# Patient Record
Sex: FEMALE | Employment: OTHER | URBAN - METROPOLITAN AREA
[De-identification: names, ages, dates, MRNs, and addresses within clinical notes are randomized per-mention and may not be internally consistent; named-entity substitution may affect disease eponyms.]

---

## 2017-07-10 ENCOUNTER — HOSPITAL ENCOUNTER (OUTPATIENT)
Dept: PHYSICAL THERAPY | Age: 62
Discharge: HOME | End: 2017-07-10
Payer: COMMERCIAL

## 2017-07-10 PROCEDURE — 97161 PT EVAL LOW COMPLEX 20 MIN: CPT

## 2017-07-10 PROCEDURE — 97110 THERAPEUTIC EXERCISES: CPT

## 2017-07-10 NOTE — PROGRESS NOTES
[facility] PHYSICAL THERAPY AT [address], [address] [place] [address] Way, [address] [place] [address], [address] [place] [address] Ln - Phone: [phone]  Fax: [phone] / [facility]  Patient Name: Alvarez Zamora : 1955   Medical   Diagnosis: Low back pain [M54.5] Treatment Diagnosis: LBP   Onset Date: Aug 2016     Referral Source: DENTON Ocampo Start of Care [facility]): 7/10/2017   Prior Hospitalization: See medical history Provider #: [id number]   Prior Level of Function: Minimal to no pain with ADLs   Comorbidities: Visual impairment   Medications: Verified on Patient Summary List   The Plan of Care and following information is based on the information from the initial evaluation.   ================================================================  Assessment / key information: Alvarez Zamora is a 57 y.o.  yo female with Dx of Low back pain [M54.5]. She reports insidious onset of L sided LBP which has persisted over the last year. She feels that the pain could be related to sitting in poorly designed chairs. Pain is made worse with sitting and bending. She denies LE symptoms. On assessment, Alvarez Zamora demonstrates Lx ROM as follows: flex= WNL, ext= mod decrease, R ROT= mod decrease, L ROT= mod decrease. LE strength and ROM are WNL. SLR test is negative . Repeated movement tests are inconclusive. FOTO score= 50%.  ================================================================  Eval Complexity: History LOW Complexity : Zero comorbidities / personal factors that will impact the outcome / POC;  Examination  MEDIUM Complexity : 3 Standardized tests and measures addressing body structure, function, activity limitation and / or participation in recreation ; Presentation MEDIUM Complexity : Evolving with changing characteristics ;   Decision Making MEDIUM Complexity : FOTO score of 26-74; Overall Complexity LOW   Problem List: pain affecting function, decrease ROM, decrease strength, decrease ADL/ functional abilitiies, decrease activity tolerance and decrease flexibility/ joint mobility   Treatment Plan may include any combination of the following: Therapeutic exercise, Therapeutic activities, Neuromuscular re-education, Physical agent/modality, Manual therapy and Patient education  Patient / Family readiness to learn indicated by: asking questions, trying to perform skills and interest  Persons(s) to be included in education: patient (P)  Barriers to Learning/Limitations: None  Measures taken:    Patient Goal (s): Learn exercise to strengthen the muscles   Patient self reported health status: excellent  Rehabilitation Potential: good   Short Term Goals: To be accomplished in  2  weeks:  1 Patient will report >= 25% improvement in symptoms with ADLs. 2 Patient will be educated in extension progression principles to alleviate symptoms. 3 Patient will be educated in good posture/ body mechanics/ ergonomics for improved ADLs/work activity.  Long Term Goals: To be accomplished in  4-6  weeks:  1 Patient to report >= 70% improvement in symptoms with ADLs. 2 Patient will be independent with finalized HEP/ self maintenance. 3 ncrease FOTO score >=  64% to indicate improved function with use of LS. 4 Restore full AROM for improved ADL participation. Frequency / Duration:   Patient to be seen  2  times per week for 4-6  weeks:  Patient / Caregiver education and instruction: self care, activity modification and exercises  G-Codes (GP): na  Therapist Signature: Sabrina Starr PT Date: 5/28/8653   Certification Period:  Time: 3:50 PM   ===================================================================  I certify that the above Physical Therapy Services are being furnished while the patient is under my care. I agree with the treatment plan and certify that this therapy is necessary.     Physician Signature:        Date: Time:     Please sign and return to [facility] or you may fax the signed copy to [phone]. Thank you.

## 2017-07-10 NOTE — PROGRESS NOTES
PHYSICAL THERAPY - DAILY TREATMENT NOTE    Patient Name: Martine Ramirez        Date: 7/10/2017  : 1955    Patient  Verified: yes  Visit #:   1     Insurance: Payor: Chelsey Yi / Plan: [address] PT / Product Type: Commerical /      In time: 300 Out time: 350   Total Treatment Time: 50     Medicare Time Tracking (below)   Total Timed Codes (min):   1:1 Treatment Time:       TREATMENT AREA =  Low back pain [M54.5]    SUBJECTIVE  Pain Level (on 0 to 10 scale):  3  / 10   Medication Changes/New allergies or changes in medical history, any new surgeries or procedures?     NO    If yes, update Summary List   Subjective Functional Status/Changes:  []  No changes reported     See EVAL/ POC         OBJECTIVE  Modalities Rationale: to decrease pain, edema, neural compromise in order to perform ADLs/ rest with improved ease        min [] Estim, type/location:                                      []  att     []  unatt     []  w/US     []  w/ice    []  w/heat    min []  Mechanical Traction: type/lbs                   []  pro   []  sup   []  int   []  cont    []  before manual    []  after manual    min []  Ultrasound, settings/location:      min []  Iontophoresis w/ dexamethasone, location:                                               []  take home patch       []  in clinic    min []  Ice     []  Heat    location/position:     min []  Vasopneumatic Device, press/temp:     min []  Other:    [] Skin assessment post-treatment (if applicable):    []  intact    []  redness- no adverse reaction     []redness  adverse reaction:        10 min Therapeutic Exercise:  [x]  See flow sheet   Rationale:      increase ROM and increase strength to improve the patients ability to perform ADLs     10 min Manual Therapy: STM/ PA ofelia- grade II   Rationale:      decrease pain, increase ROM, increase tissue extensibility and decrease trigger points to improve patient's ability to perform ADLs     min Neuromuscular Re-ed:    Rationale:    improve coordination, improve balance, increase proprioception and improved posture, improve motor control to improve the patients ability to perform ADLs     min Gait Training:    Rationale:      x min Patient Education:  YES  Reviewed HEP/ sitting posture   []  Progressed/Changed HEP based on: Other Objective/Functional Measures:    See EVAL/ POC     Post Treatment Pain Level (on 0 to 10) scale:   3  / 10     ASSESSMENT      [x]  See POC     PLAN  [x]  Upgrade activities as tolerated  Continue plan of care: yes   []  Discharge due to :    []  Other:      Therapist: Dustin Pierce PT    Date: 7/10/2017 Time: 3:49 PM     No future appointments.

## 2017-07-12 ENCOUNTER — APPOINTMENT (OUTPATIENT)
Dept: PHYSICAL THERAPY | Age: 62
End: 2017-07-12

## 2017-07-24 ENCOUNTER — HOSPITAL ENCOUNTER (OUTPATIENT)
Dept: PHYSICAL THERAPY | Age: 62
Discharge: HOME | End: 2017-07-24
Payer: COMMERCIAL

## 2017-07-24 PROCEDURE — 97110 THERAPEUTIC EXERCISES: CPT

## 2017-07-24 PROCEDURE — 97140 MANUAL THERAPY 1/> REGIONS: CPT

## 2017-07-24 NOTE — PROGRESS NOTES
PHYSICAL THERAPY - DAILY TREATMENT NOTE    Patient Name: Micheline Aguilera        Date: 2017  : 1955   YES Patient  Verified  Visit #:   2     Insurance: Payor: Lamberto Dill / Plan: [address] [place] [address] Rd PT / Product Type: Commerical /      In time: 830 Out time: 905   Total Treatment Time: 35     Medicare Time Tracking (below)   Total Timed Codes (min):   1:1 Treatment Time:       TREATMENT AREA = Low back pain [M54.5]    SUBJECTIVE  Pain Level (on 0 to 10 scale):  0  / 10   Medication Changes/New allergies or changes in medical history, any new surgeries or procedures? yes    If yes, update Summary List   Subjective Functional Status/Changes:  []  No changes reported     Started taking prednisone. Really helped .   No pain now- just tightness        OBJECTIVE    15 min Therapeutic Exercise:  [x]  See flow sheet   Rationale:      increase ROM and dec neural compromise to improve the patients ability to sit or stand     10 min Manual Therapy: Technique:      [x] STM[]IASTM[x]TPR[]PROM[] Stretching  [] SOR[] man traction[]   []OP with REIL  []Jt manipulation []Gr I [] II []  III [] IV[] V[]    Treatment Area: LB    Rationale:      decrease pain, increase ROM, increase tissue extensibility and decrease trigger points to improve patient's ability to sit/ stand            Modalities Rationale:     decrease inflammation and decrease pain to improve patient's ability to sit/ stand   min [] Estim, type/location:                                      []  att     []  unatt     []  w/US     []  w/ice    []  w/heat    min []  Mechanical Traction: type/lbs                   []  pro   []  sup   []  int   []  cont    []  before manual    []  after manual    min []  Ultrasound, settings/location:      min []  Iontophoresis w/ dexamethasone, location:                                               []  take home patch       []  in clinic   10 min [x]  Ice     []  Heat    location/position:     min [] Vasopneumatic Device, press/temp:     min []  Other:    [x] Skin assessment post-treatment (if applicable):    [x]  intact    [x]  redness- no adverse reaction     []redness  adverse reaction:         min Gait Training:    Rationale:        throughout Rx min Patient Education:  YES  Reviewed HEP   []  Progressed/Changed HEP based on: Other Objective/Functional Measures:    Lx ext ROM- approx 75%    Reviewed lifting mechanics   Post Treatment Pain Level (on 0 to 10) scale:   3- sore  / 10     ASSESSMENT  Assessment/Changes in Function:     Functional improvement:  Less pain brushing teeth   Functional limitation:  \"tight\"      []  See Progress Note/Recertification   Patient will continue to benefit from skilled PT services to modify and progress therapeutic interventions, address functional mobility deficits, address ROM deficits, address strength deficits, analyze and address soft tissue restrictions, analyze and cue movement patterns, analyze and modify body mechanics/ergonomics and assess and modify postural abnormalities to attain remaining goals.    Progress toward goals / Updated goals:    No pain     PLAN  [x]  Upgrade activities as tolerated YES Continue plan of care   []  Discharge due to :    []  Other:      Therapist: May Ba PT    Date: 7/24/2017 Time: 8:29 AM     Future Appointments  Date Time Provider Noel Lozoya   7/24/2017 8:30 AM May Ba PT REHAB CENTER AT [address]   7/27/2017 8:00 AM Angely Saleem PT REHAB CENTER AT [address]   7/31/2017 9:30 AM May Ba PT REHAB CENTER AT [address]   8/3/2017 3:00 PM May Ba PT REHAB CENTER AT [address]   8/7/2017 2:00 PM May Ba PT REHAB CENTER AT [address]   8/10/2017 3:00 PM May Ba PT REHAB CENTER AT [address]   8/14/2017 2:00 PM May Ba PT REHAB CENTER AT [address]   8/17/2017 3:00 PM May Ba PT REHAB CENTER AT [address]

## 2017-07-27 ENCOUNTER — HOSPITAL ENCOUNTER (OUTPATIENT)
Dept: PHYSICAL THERAPY | Age: 62
Discharge: HOME | End: 2017-07-27
Payer: COMMERCIAL

## 2017-07-27 PROCEDURE — 97110 THERAPEUTIC EXERCISES: CPT

## 2017-07-27 PROCEDURE — 97140 MANUAL THERAPY 1/> REGIONS: CPT

## 2017-07-27 NOTE — PROGRESS NOTES
PHYSICAL THERAPY - DAILY TREATMENT NOTE    Patient Name: Guera Sauer        Date: 2017  : 1955    Patient  Verified: YES  Visit #:   3   of   8  Insurance: Payor: Malka Kent / Plan: [address] [place] [address] Rd PT / Product Type: Commerical /      In time: 8:05 Out time: 8:40   Total Treatment Time: 35     Medicare Time Tracking (below)   Total Timed Codes (min):  - 1:1 Treatment Time:  -     TREATMENT AREA/ DIAGNOSIS = Low back pain [M54.5]    SUBJECTIVE  Pain Level (on 0 to 10 scale):  0  / 10   Medication Changes/New allergies or changes in medical history, any new surgeries or procedures?     NO    If yes, update Summary List   Subjective Functional Status/Changes:  []  No changes reported     Functional improvement no LBP   Functional limitation* just stiff      OBJECTIVE  Modalities Rationale:     decrease pain and increase tissue extensibility to improve patient's ability to perform ADLs without pain   min [] Estim, type/location:                                      []  att     []  unatt     []  w/US     []  w/ice    []  w/heat    min []  Mechanical Traction: type/lbs                   []  pro   []  sup   []  int   []  cont    []  before manual    []  after manual    min []  Ultrasound, settings/location:      min []  Iontophoresis w/ dexamethasone, location:                                               []  take home patch       []  in clinic   10 min []  Ice     [x]  Heat    location/position: Prone     min []  Vasopneumatic Device, press/temp:     min []  Other:    [x] Skin assessment post-treatment (if applicable):    [x]  intact    [x]  redness- no adverse reaction     []redness  adverse reaction:        10 min Manual Therapy: DTM to L/S and T/S with focus on L QL region, OP with REIL   Rationale:      decrease pain, increase ROM and increase tissue extensibility to improve patient's ability to perform ADLs without pain    15 min Therapeutic Exercise:  [x]  See flow sheet   Rationale: increase ROM and increase strength to improve the patients ability to perform ADLs without pain          min Patient Education:  Yes    [x] Reviewed HEP   []  Progressed/Changed HEP based on: Other Objective/Functional Measures:    Pt with out pain  Reviewed proper sitting and bending mechanics  trie heat, due to no pain    Post Treatment Pain Level (on 0 to 10) scale:   0  / 10     ASSESSMENT  Assessment/Changes in Function:     Pt responding well to ext and L lat flex     []  See Progress Note/Recertification   Patient will continue to benefit from skilled PT services to modify and progress therapeutic interventions, address functional mobility deficits, address ROM deficits, address strength deficits, analyze and address soft tissue restrictions, analyze and cue movement patterns and analyze and modify body mechanics/ergonomics to attain remaining goals.    Progress toward goals / Updated goals:    Responding well to ext  Much less pain already     PLAN  [x]  Upgrade activities as tolerated YES Continue plan of care   []  Discharge due to :    []  Other:      Therapist: Ciera Connor PT    Date: 7/27/2017 Time: 8:40 AM        Future Appointments  Date Time Provider Noel Lozoya   7/31/2017 9:30 AM Tania Leggett PT REHAB CENTER AT [facility]   8/3/2017 3:00 PM Tania Leggett PT REHAB CENTER AT [facility]   8/7/2017 2:00 PM Tania Leggett PT REHAB CENTER AT [facility]   8/10/2017 3:00 PM Tania Leggett PT REHAB CENTER AT [facility]   8/14/2017 2:00 PM Tania Leggett PT REHAB CENTER AT [facility]   8/17/2017 3:00 PM Tania Leggett PT REHAB CENTER AT [facility]

## 2017-07-31 ENCOUNTER — HOSPITAL ENCOUNTER (OUTPATIENT)
Dept: PHYSICAL THERAPY | Age: 62
Discharge: HOME | End: 2017-07-31
Payer: COMMERCIAL

## 2017-07-31 PROCEDURE — 97140 MANUAL THERAPY 1/> REGIONS: CPT

## 2017-07-31 PROCEDURE — 97110 THERAPEUTIC EXERCISES: CPT

## 2017-07-31 NOTE — PROGRESS NOTES
PHYSICAL THERAPY - DAILY TREATMENT NOTE    Patient Name: Judge Hartmann        Date: 2017  : 1955   YES Patient  Verified  Visit #:   4   of   8  Insurance: Payor: Jakob Ballesteros / Plan:  [address] PT / Product Type: Commerical /      In time: 830 Out time: 910   Total Treatment Time: 40     Medicare Time Tracking (below)   Total Timed Codes (min):   1:1 Treatment Time:       TREATMENT AREA = Low back pain [M54.5]    SUBJECTIVE  Pain Level (on 0 to 10 scale):  0  / 10   Medication Changes/New allergies or changes in medical history, any new surgeries or procedures? yes    If yes, update Summary List   Subjective Functional Status/Changes:  []  No changes reported     No pain, just tight.   Only problem is sitting        OBJECTIVE    15 min Therapeutic Exercise:  [x]  See flow sheet   Rationale:      increase ROM, increase strength and dec neural compromise to improve the patients ability to sit longer     15 min Manual Therapy: Technique:      [] STM[]IASTM[]TPR[]PROM[] Stretching  [] SOR[] man traction[]   []OP with REIL  []Jt manipulation []Gr I [] II []  III [] IV[] V[]    Treatment Area:  LB   Rationale:      decrease pain, increase ROM, increase tissue extensibility, decrease trigger points and dec neural compromise to improve patient's ability to sit longer duration        Modalities Rationale:     decrease pain and increase tissue extensibility to improve patient's ability to relax   min [] Estim, type/location:                                      []  att     []  unatt     []  w/US     []  w/ice    []  w/heat    min []  Mechanical Traction: type/lbs                   []  pro   []  sup   []  int   []  cont    []  before manual    []  after manual    min []  Ultrasound, settings/location:      min []  Iontophoresis w/ dexamethasone, location:                                               []  take home patch       []  in clinic   10 min []  Ice     []  Heat    location/position:     min []  Vasopneumatic Device, press/temp:     min []  Other:    [x] Skin assessment post-treatment (if applicable):    [x]  intact    [x]  redness- no adverse reaction     []redness  adverse reaction:             throughout Rx min Patient Education:  YES  Reviewed HEP   []  Progressed/Changed HEP based on: Other Objective/Functional Measures:    Sl dec ext ROM, full rotation     Post Treatment Pain Level (on 0 to 10) scale:   0  / 10     ASSESSMENT  Assessment/Changes in Function:     Functional improvement:  Able to sit short periods   Functional limitation:  Sitting limited to 10 min before needs to shift      []  See Progress Note/Recertification   Patient will continue to benefit from skilled PT services to modify and progress therapeutic interventions, address functional mobility deficits, address ROM deficits, address strength deficits, analyze and address soft tissue restrictions, analyze and cue movement patterns and analyze and modify body mechanics/ergonomics to attain remaining goals.    Progress toward goals / Updated goals:    Min pain, inc ROM     PLAN  [x]  Upgrade activities as tolerated YES Continue plan of care   []  Discharge due to :    []  Other:      Therapist: Cassidy Aguilar PT    Date: 7/31/2017 Time: 8:31 AM     Future Appointments  Date Time Provider Noel Lozoya   8/3/2017 3:00 PM Cassidy Aguilar PT REHAB CENTER AT [facility]   8/7/2017 2:00 PM Cassidy Aguilar, PT REHAB CENTER AT [facility]   8/10/2017 3:00 PM Cassidy Aguilar, PT REHAB CENTER AT [facility]   8/14/2017 2:00 PM Cassidy Aguilar, PT REHAB CENTER AT [facility]   8/17/2017 3:00 PM Cassidy Aguilar, PT REHAB CENTER AT [facility]   0

## 2017-08-03 ENCOUNTER — HOSPITAL ENCOUNTER (OUTPATIENT)
Dept: PHYSICAL THERAPY | Age: 62
Discharge: HOME | End: 2017-08-03
Payer: COMMERCIAL

## 2017-08-03 PROCEDURE — 97110 THERAPEUTIC EXERCISES: CPT

## 2017-08-03 PROCEDURE — 97140 MANUAL THERAPY 1/> REGIONS: CPT

## 2017-08-03 NOTE — PROGRESS NOTES
PHYSICAL THERAPY - DAILY TREATMENT NOTE    Patient Name: Alvarez Zamora        Date: 8/3/2017  : 1955   YES Patient  Verified  Visit #:   5   of   8  Insurance: Payor: Abrahan Sable / Plan: [address] PT / Product Type: Commerical /      In time: 245 Out time: ***   Total Treatment Time: ***     Medicare Time Tracking (below)   Total Timed Codes (min):   1:1 Treatment Time:       TREATMENT AREA = Low back pain [M54.5]    SUBJECTIVE  Pain Level (on 0 to 10 scale):  3  / 10   Medication Changes/New allergies or changes in medical history, any new surgeries or procedures? NO    If yes, update Summary List   Subjective Functional Status/Changes:  []  No changes reported     Stopped taking Prednisone. Sitting is just the worse.   Trying to do ex 4x/day        OBJECTIVE    15 min Therapeutic Exercise:  [x]  See flow sheet   Rationale:      increase ROM, increase strength and dec neural compromise to improve the patients ability to sit longer duration     15 min Manual Therapy: Technique:      [x] STM[]IASTM[x]TPR[]PROM[] Stretching  [] SOR[] man traction[]   [x]OP with REIL  []Jt manipulation []Gr I [] II []  III [] IV[] V[]    Treatment Area:  LS- L glut   Rationale:      decrease pain, increase ROM, increase tissue extensibility and decrease trigger points to improve patient's ability to sit longer duration        Modalities Rationale:     decrease pain and increase tissue extensibility to improve patient's ability to relax muscle tone   min [] Estim, type/location:                                      []  att     []  unatt     []  w/US     []  w/ice    []  w/heat    min []  Mechanical Traction: type/lbs                   []  pro   []  sup   []  int   []  cont    []  before manual    []  after manual    min []  Ultrasound, settings/location:      min []  Iontophoresis w/ dexamethasone, location:                                               []  take home patch       []  in clinic   10 min []  Ice [x]  Heat    location/position:     min []  Vasopneumatic Device, press/temp:     min []  Other:    [x] Skin assessment post-treatment (if applicable):    [x]  intact    [x]  redness- no adverse reaction     []redness  adverse reaction:          throughout Rx min Patient Education:  YES  Reviewed HEP   []  Progressed/Changed HEP based on: Other Objective/Functional Measures:    Tender/ inc tone- L glut     Post Treatment Pain Level (on 0 to 10) scale:   ***  / 10     ASSESSMENT  Assessment/Changes in Function:     Functional improvement:  Able to lean forward to brush teeth   Functional limitation:  Limited sitting tolerance      []  See Progress Note/Recertification   Patient will continue to benefit from skilled PT services to modify and progress therapeutic interventions, address functional mobility deficits, address ROM deficits, analyze and address soft tissue restrictions, analyze and cue movement patterns and analyze and modify body mechanics/ergonomics to attain remaining goals.    Progress toward goals / Updated goals:    Slow progress     PLAN  [x]  Upgrade activities as tolerated YES Continue plan of care   []  Discharge due to :    []  Other:      Therapist: Jayna Lopez PT    Date: 8/3/2017 Time: 2:46 PM     Future Appointments  Date Time Provider Noel Lozoya   8/3/2017 3:00 PM Jayna Lopez PT REHAB CENTER AT [facility]   8/7/2017 2:00 PM Jayna Lopez PT REHAB CENTER AT [facility]   8/10/2017 3:00 PM Jayna Lopez PT REHAB CENTER AT [facility]   8/14/2017 2:00 PM Jayna Lopez PT REHAB CENTER AT [facility]   8/17/2017 3:00 PM Jayna Lopez PT REHAB CENTER AT [facility]

## 2017-08-07 ENCOUNTER — HOSPITAL ENCOUNTER (OUTPATIENT)
Dept: PHYSICAL THERAPY | Age: 62
Discharge: HOME | End: 2017-08-07
Payer: COMMERCIAL

## 2017-08-07 PROCEDURE — 97140 MANUAL THERAPY 1/> REGIONS: CPT

## 2017-08-07 PROCEDURE — 97110 THERAPEUTIC EXERCISES: CPT

## 2017-08-07 NOTE — PROGRESS NOTES
PHYSICAL THERAPY - DAILY TREATMENT NOTE    Patient Name: Sonya Gamboa        Date: 2017  : 1955   YES Patient  Verified  Visit #:   5   of   8  Insurance: Payor: Tereza June / Plan: [address] PT / Product Type: Commerical /      In time: 830 Out time: 910   Total Treatment Time: 40     Medicare Time Tracking (below)   Total Timed Codes (min):   1:1 Treatment Time:       TREATMENT AREA = Low back pain [M54.5]    SUBJECTIVE  Pain Level (on 0 to 10 scale):  0  / 10   Medication Changes/New allergies or changes in medical history, any new surgeries or procedures? NO    If yes, update Summary List   Subjective Functional Status/Changes:  []  No changes reported     Pain over weekend from traveling and sitting in low car     Overall- 50% better.    OBJECTIVE    15 min Therapeutic Exercise:  [x]  See flow sheet   Rationale:      increase ROM, increase strength and dec neural compromise to improve the patients ability to sit longer duration     15 min Manual Therapy: Technique:      [x] STM[]IASTM[x]TPR[]PROM[] Stretching  [] SOR[] man traction[]   [x]OP with REIL  []Jt manipulation []Gr I [] II []  III [] IV[] V[]    Treatment Area:  LB   Rationale:      decrease pain, increase ROM, increase tissue extensibility and decrease trigger points to improve patient's ability to sit longer duration        Modalities Rationale:     decrease pain and increase tissue extensibility to improve patient's ability to relax muscle    min [] Estim, type/location:                                      []  att     []  unatt     []  w/US     []  w/ice    []  w/heat    min []  Mechanical Traction: type/lbs                   []  pro   []  sup   []  int   []  cont    []  before manual    []  after manual    min []  Ultrasound, settings/location:      min []  Iontophoresis w/ dexamethasone, location:                                               []  take home patch       []  in clinic   10 min []  Ice     [x]  Heat location/position:     min []  Vasopneumatic Device, press/temp:     min []  Other:    [x] Skin assessment post-treatment (if applicable):    [x]  intact    [x]  redness- no adverse reaction     []redness  adverse reaction:          throughout Rx min Patient Education:  YES  Reviewed HEP   []  Progressed/Changed HEP based on: Other Objective/Functional Measures:    Tender L glut region     Post Treatment Pain Level (on 0 to 10) scale:   0  / 10     ASSESSMENT  Assessment/Changes in Function:       Functional limitation:  sitting      []  See Progress Note/Recertification   Patient will continue to benefit from skilled PT services to modify and progress therapeutic interventions, address functional mobility deficits, address ROM deficits, address strength deficits, analyze and address soft tissue restrictions, analyze and cue movement patterns and analyze and modify body mechanics/ergonomics to attain remaining goals.    Progress toward goals / Updated goals:    Able to manage pain with HEP     PLAN  [x]  Upgrade activities as tolerated YES Continue plan of care   []  Discharge due to :    []  Other:      Therapist: Lavon Sterling PT    Date: 8/7/2017 Time: 8:29 AM     Future Appointments  Date Time Provider Noel Lozoya   8/7/2017 8:30 AM Lavon Sterling PT REHAB [facility] AT [address]   8/10/2017 3:00 PM Lavon Sterling PT REHAB CENTER AT [address]   8/14/2017 2:00 PM Lavon Sterling PT REHAB CENTER AT [address]   8/17/2017 3:00 PM Puxico Hallie, PT REHAB CENTER AT [address]

## 2017-08-10 ENCOUNTER — HOSPITAL ENCOUNTER (OUTPATIENT)
Dept: PHYSICAL THERAPY | Age: 62
Discharge: HOME | End: 2017-08-10
Payer: COMMERCIAL

## 2017-08-10 PROCEDURE — 97140 MANUAL THERAPY 1/> REGIONS: CPT

## 2017-08-10 PROCEDURE — 97110 THERAPEUTIC EXERCISES: CPT

## 2017-08-10 NOTE — PROGRESS NOTES
Ilana Diadema  PHYSICAL THERAPY AT [address], [address] [place] [address], [facility], [address] [facility]  Phone: [phone]  Fax: [phone]  PROGRESS NOTE  Patient Name: Ada Singh : 1955   Treatment/Medical Diagnosis: Low back pain [M54.5]   Referral Source: DENTON Peoples     Date of Initial Visit: 7/10/17 Attended Visits: 7 Missed Visits: 0     SUMMARY OF TREATMENT  Manual therapy, including massage, OP with REIL, L piriformis release and stretching. Core stability and stretching program.  Education on posture and body mechanics as well as HEP of rep extension. CURRENT STATUS  The patient reports she is 20% improved. She has signs and symptoms consistent with an upper L L/S disc derangement. She has much improved knowledge of correct posture and body mechanics. Goal/Measure of Progress Goal Met? 1.  pt to report 70% improvement   Status at last Eval: - Current Status: 20% improved progressing   2. Independent with HEP   Status at last Eval: No HEP Current Status: Independent with HEP yes   3. Increase FOTO score to >=64, to indicate increased function   Status at last Eval: 50 Current Status: 52 progressing   4. Restore full AROM for improved ADLs    Status at last Eval: Flexion = WNLs, ext and Rot moderately decreased Current Status: Full flexion and ext and B rotation are 905 of normal progressing     New Goals to be achieved in __4__  weeks:  1. No L buttock pain for > 1 week   2. Full B pain free trunk rotation   3. Pt able to sit > = 1 hour without L buttock pain    4.  -     RECOMMENDATIONS  Continue PT 2x/week x 4-5 weeks  If you have any questions/comments please contact us directly at [phone]. Thank you for allowing us to assist in the care of your patient. Therapist Signature:  Ciera Connor PT, MDT Date: 8/10/2017     Time: 9:24 AM   NOTE TO PHYSICIAN:  PLEASE COMPLETE THE ORDERS BELOW AND FAX TO   [facility] at [address]: [phone]. If you are unable to process this request in 24 hours please contact our office: [phone].    ___ I have read the above report and request that my patient continue as recommended.   ___ I have read the above report and request that my patient continue therapy with the following changes/special instructions:_________________________________________________________   ___ I have read the above report and request that my patient be discharged from therapy.      Physician Signature:        Date:       Time:

## 2017-08-14 ENCOUNTER — APPOINTMENT (OUTPATIENT)
Dept: PHYSICAL THERAPY | Age: 62
End: 2017-08-14
Payer: COMMERCIAL

## 2017-08-17 ENCOUNTER — HOSPITAL ENCOUNTER (OUTPATIENT)
Dept: PHYSICAL THERAPY | Age: 62
End: 2017-08-17
Payer: COMMERCIAL

## 2017-08-17 ENCOUNTER — HOSPITAL ENCOUNTER (OUTPATIENT)
Dept: PHYSICAL THERAPY | Age: 62
Discharge: HOME | End: 2017-08-17
Payer: COMMERCIAL

## 2017-08-17 PROCEDURE — 97110 THERAPEUTIC EXERCISES: CPT

## 2017-08-17 PROCEDURE — 97140 MANUAL THERAPY 1/> REGIONS: CPT

## 2017-08-17 NOTE — PROGRESS NOTES
PHYSICAL THERAPY - DAILY TREATMENT NOTE    Patient Name: Elidia Seymour        Date: 2017  : 1955    Patient  Verified: YES  Visit #:     Insurance: Payor: Anthony Backer / Plan: [address] PT / Product Type: Commerical /      In time: 3:10 Out time: 3:50   Total Treatment Time: 40     Medicare Time Tracking (below)   Total Timed Codes (min):  - 1:1 Treatment Time:  -     TREATMENT AREA/ DIAGNOSIS = Low back pain [M54.5]    SUBJECTIVE  Pain Level (on 0 to 10 scale):  4  / 10   Medication Changes/New allergies or changes in medical history, any new surgeries or procedures? NO    If yes, update Summary List   Subjective Functional Status/Changes:  [x]  No changes reported     Functional improvement    Functional limitation  A bad day today     OBJECTIVE  Modalities Rationale:     decrease edema, decrease inflammation and decrease pain to improve patient's ability to perform ADLs without pain   min [] Estim, type/location:                                      []  att     []  unatt     []  w/US     []  w/ice    []  w/heat    min []  Mechanical Traction: type/lbs                   []  pro   []  sup   []  int   []  cont    []  before manual    []  after manual    min []  Ultrasound, settings/location:      min []  Iontophoresis w/ dexamethasone, location:                                               []  take home patch       []  in clinic   10 min [x]  Ice     []  Heat    location/position:  In prone extension, using the table to put her into passive extension    min []  Vasopneumatic Device, press/temp:     min []  Other:    [x] Skin assessment post-treatment (if applicable):    [x]  intact    [x]  redness- no adverse reaction     []redness  adverse reaction:        10 min Manual Therapy: DTM to L/S and T/S, OP with REIL, L piriformis release, GD IV PA mobs to L/S   Rationale:      decrease pain, increase ROM and increase tissue extensibility to improve patient's ability to perform ADLs without pain    20 min Therapeutic Exercise:  [x]  See flow sheet   Rationale:      increase ROM and increase strength to improve the patients ability to perform ADLs without pain          min Patient Education:  Yes    [x] Reviewed HEP   []  Progressed/Changed HEP based on: Other Objective/Functional Measures:    Pt with L buttock pain, mad worse with sitting  No change in pain with L SGIS or REIL, REIL with OP  Tender L piriformis  Added static passive ext for 10 minutes with ice   Post Treatment Pain Level (on 0 to 10) scale:   2  / 10     ASSESSMENT  Assessment/Changes in Function:     Signs and symptoms consistent with disc derangement     []  See Progress Note/Recertification   Patient will continue to benefit from skilled PT services to modify and progress therapeutic interventions, address functional mobility deficits, address ROM deficits, address strength deficits, analyze and address soft tissue restrictions, analyze and cue movement patterns, analyze and modify body mechanics/ergonomics and assess and modify postural abnormalities to attain remaining goals. Progress toward goals / Updated goals:    Good hip hinge and body mechanics     PLAN  [x]  Upgrade activities as tolerated YES Continue plan of care   []  Discharge due to :    []  Other:      Therapist: Alin Huang PT    Date: 8/17/2017 Time: 3:46 PM        No future appointments.

## 2017-10-10 ENCOUNTER — HOSPITAL ENCOUNTER (OUTPATIENT)
Dept: PHYSICAL THERAPY | Age: 62
Discharge: HOME | End: 2017-10-10
Payer: COMMERCIAL

## 2017-10-10 PROCEDURE — 97110 THERAPEUTIC EXERCISES: CPT

## 2017-10-10 PROCEDURE — 97161 PT EVAL LOW COMPLEX 20 MIN: CPT

## 2017-10-10 NOTE — PROGRESS NOTES
PHYSICAL THERAPY - DAILY TREATMENT NOTE    Patient Name: Pily Merchant        Date: 10/10/2017  : 1955    Patient  Verified: yes  Visit #:   1     Insurance: Payor: Ladan Telles / Plan: [address] PT / Product Type: Commerical /      In time: 1210 Out time: 115   Total Treatment Time: 65   TREATMENT AREA =  Low back pain [M54.5]  Left hip pain [M25.552]    SUBJECTIVE  Pain Level (on 0 to 10 scale):  3  / 10   Medication Changes/New allergies or changes in medical history, any new surgeries or procedures? NO    If yes, update Summary List   Subjective Functional Status/Changes:  []  No changes reported     See IE.     OBJECTIVE    10 min Manual Therapy: B prone hip ER, IR sidelying hip extension, LAD, SFMA assessment to determine joint restrictions   Rationale:      decrease pain, increase ROM, increase tissue extensibility and decrease trigger points to improve patient's ability to perform ADLs    15 min Patient Education:  YES  Reviewed HEP/ sitting posture   []  Progressed/Changed HEP based on: Other Objective/Functional Measures:  See IEval   Post Treatment Pain Level (on 0 to 10) scale:   3   10     ASSESSMENT      [x]  See POC     PLAN  [x]  Upgrade activities as tolerated  Continue plan of care: yes   []  Discharge due to :    []  Other:      Therapist: Bruno Brand PT    Date: 10/10/2017 Time: 12:49 PM     No future appointments.

## 2017-10-10 NOTE — PROGRESS NOTES
[facility] PHYSICAL THERAPY AT [address], [address] [place] [address], [address], [address] [place] [address]  Phone: [phone]  Fax: [phone]  DISCHARGE NOTE  Patient Name: Miah Woodson : 1955   Treatment/Medical Diagnosis: Low back pain [M54.5]   Referral Source: DENTON Villalpando     Date of Initial Visit: 7/10/17 Attended Visits: 8 Missed Visits: 0     SUMMARY OF TREATMENT  Manual therapy, including massage, OP with REIL, L piriformis release and stretching. Core stability and stretching program.  Education on posture and body mechanics as well as HEP of rep extension. CURRENT STATUS  The patient only attended one more visit after her 8/10/17 reassessment and has been discharged. Goal/Measure of Progress Goal Met? 1.  pt to report 70% improvement   Status at last Eval: - Current Status: 20% improved progressing   2. Independent with HEP   Status at last Eval: No HEP Current Status: Independent with HEP yes   3. Increase FOTO score to >=64, to indicate increased function   Status at last Eval: 50 Current Status: 52 progressing   4. Restore full AROM for improved ADLs    Status at last Eval: Flexion = WNLs, ext and Rot moderately decreased Current Status: Full flexion and ext and B rotation are 905 of normal progressing   RECOMMENDATIONS  DC patient from PT, secondary to  Non attendance. If you have any questions/comments please contact us directly at [phone]. Thank you for allowing us to assist in the care of your patient. Therapist Signature:  Matt Christopher PT, MDT Date: 10/10/2017     Time: 9:24 AM

## 2017-10-12 ENCOUNTER — HOSPITAL ENCOUNTER (OUTPATIENT)
Dept: PHYSICAL THERAPY | Age: 62
Discharge: HOME | End: 2017-10-12
Payer: COMMERCIAL

## 2017-10-12 PROCEDURE — 97110 THERAPEUTIC EXERCISES: CPT

## 2017-10-12 PROCEDURE — 97140 MANUAL THERAPY 1/> REGIONS: CPT

## 2017-10-12 NOTE — PROGRESS NOTES
PHYSICAL THERAPY - DAILY TREATMENT NOTE    Patient Name: Sharmila Pineda        Date: 10/12/2017  : 1955   YES Patient  Verified  Visit #:   2   of   8  Insurance: Payor: Timur Smith / Plan: [address] / Product Type: Commerical /      In time: 1110 Out time: 6698   Total Treatment Time: 60     TREATMENT AREA =  Low back pain [M54.5]  Left hip pain [M25.552]    SUBJECTIVE  Pain Level (on 0 to 10 scale):  2  / 10   Medication Changes/New allergies or changes in medical history, any new surgeries or procedures? NO    If yes, update Summary List   Subjective Functional Status/Changes:  []  No changes reported     Functional improvements: doing hot yoga noticed some restrictions and pain in R hip anterior but modified  Functional impairments: pain in B hip with walking, standing.        OBJECTIVE  Modalities Rationale:     decrease pain to improve patient's ability to perform ADLs   min [] Estim, type/location:                                      []  att     []  unatt     []  w/US     []  w/ice    []  w/heat    min []  Mechanical Traction: type/lbs                   []  pro   []  sup   []  int   []  cont    []  before manual    []  after manual    min []  Ultrasound, settings/location:      min []  Iontophoresis w/ dexamethasone, location:                                               []  take home patch       []  in clinic   10 min [x]  Ice     []  Heat    location/position: Supine L L/S and R hip    min []  Vasopneumatic Device, press/temp:     min []  Other:    [x] Skin assessment post-treatment (if applicable):    [x]  intact    []  redness- no adverse reaction     []redness  adverse reaction:        20 min Manual Therapy: Technique:      [x] S/DTM []IASTM []PROM [] Passive Stretching   [x]manual TPR    [x]Jt manipulation:Gr I [] II []  III [] IV[] V[]  Treatment Area:  STM to B gluteals, R hip flexor and prone hip ER, IR stretching   Rationale:      decrease pain and increase ROM to improve patient's ability to perform ADLs    30 min Therapeutic Exercise:  [x]  See flow sheet   Rationale:      increase ROM and increase strength to improve the patients ability to perform ADLs. 10, during CP min Patient Education:  YES  Reviewed HEP   []  Progressed/Changed HEP based on: Other Objective/Functional Measures: Added bridges, clams, SKTC/modified valarie stretch on R. Pt with pain during [address] R. Pain with full hip extension. Post Treatment Pain Level (on 0 to 10) scale:   2  / 10     ASSESSMENT  Assessment/Changes in Function:     Pt with good response to updated HEP. Ed on performing hip flexor stretching for HEP. CP for home use ed.     []  See Progress Note/Recertification   Patient will continue to benefit from skilled PT services to modify and progress therapeutic interventions, address functional mobility deficits, address ROM deficits, address strength deficits, analyze and address soft tissue restrictions, analyze and cue movement patterns, analyze and modify body mechanics/ergonomics and assess and modify postural abnormalities to attain remaining goals. Progress toward goals / Updated goals:    Pt with good progress to I with HEP, STG 1.      PLAN  [x]  Upgrade activities as tolerated YES Continue plan of care   []  Discharge due to :    []  Other:      Therapist: Oli Golden PT    Date: 10/12/2017 Time: 11:15 AM     Future Appointments  Date Time Provider Noel Lozoya   10/17/2017 2:30 PM Oli Golden PT REHAB CENTER AT [facility]   10/25/2017 12:00 PM Oli Golden PT REHAB CENTER AT [facility]   11/1/2017 9:00 AM Nadia Aquino PT REHAB CENTER AT [facility]   11/10/2017 10:30 AM Nadia Aquino PT REHAB CENTER AT [facility]   11/15/2017 11:30 AM Nadia Aquino PT REHAB CENTER AT [facility]   11/17/2017 10:30 AM Nadia Aquino PT REHAB CENTER AT [facility]   11/20/2017 10:30 AM Nadia Aquino PT REH[facility] AT [facility]   11/22/2017 10:30 AM Nadia Aquino, PT REHAB CENTER AT [facility]   11/27/2017 10:30 AM Nadia Aquino, PT REHAB CENTER AT [facility]   11/29/2017 10:30 AM Nadia Aquino, PT REHAB CENTER AT [facility]

## 2017-10-17 ENCOUNTER — HOSPITAL ENCOUNTER (OUTPATIENT)
Dept: PHYSICAL THERAPY | Age: 62
End: 2017-10-17
Payer: COMMERCIAL

## 2017-10-25 ENCOUNTER — HOSPITAL ENCOUNTER (OUTPATIENT)
Dept: PHYSICAL THERAPY | Age: 62
Discharge: HOME | End: 2017-10-25
Payer: COMMERCIAL

## 2017-10-25 PROCEDURE — 97110 THERAPEUTIC EXERCISES: CPT

## 2017-10-25 PROCEDURE — 97140 MANUAL THERAPY 1/> REGIONS: CPT

## 2017-10-25 NOTE — PROGRESS NOTES
PHYSICAL THERAPY - DAILY TREATMENT NOTE    Patient Name: Silvia Tony        Date: 10/25/2017  : 1955   YES Patient  Verified  Visit #:   3 of   8  Insurance: Payor: Tilman Bad / Plan:  [address] PT / Product Type: Commerical /      In time: 12 Out time: 1    Total Treatment Time: 60     TREATMENT AREA =  Low back pain [M54.5]  Left hip pain [M25.552]    SUBJECTIVE  Pain Level (on 0 to 10 scale):  2  / 10   Medication Changes/New allergies or changes in medical history, any new surgeries or procedures?    no    If yes, update Summary List   Subjective Functional Status/Changes:  []  No changes reported     Functional improvements: 25-50% better, noting more awareness with lifting leg, able to sit a little longer. Able to go back to light yoga. Steroidal injection to bursa L, lidocaine to piriformis. Functional impairments: pain in B hip with walking, standing.        OBJECTIVE  Modalities Rationale:     decrease pain to improve patient's ability to perform ADLs   min [] Estim, type/location:                                      []  att     []  unatt     []  w/US     []  w/ice    []  w/heat    min []  Mechanical Traction: type/lbs                   []  pro   []  sup   []  int   []  cont    []  before manual    []  after manual    min []  Ultrasound, settings/location:      min []  Iontophoresis w/ dexamethasone, location:                                               []  take home patch       []  in clinic   10 min [x]  Ice     []  Heat    location/position: Supine L L/S and R hip    min []  Vasopneumatic Device, press/temp:     min []  Other:    [x] Skin assessment post-treatment (if applicable):    [x]  intact    [x]  redness- no adverse reaction     []redness  adverse reaction:        20 min Manual Therapy: Technique:      [x] S/DTM []IASTM []PROM [] Passive Stretching   [x]manual TPR    [x]Jt manipulation:Gr I [] II []  III [] IV[] V[]  Treatment Area:  STM to B gluteals, R hip flexor and prone hip ER, IR stretching   Rationale:      decrease pain and increase ROM to improve patient's ability to perform ADLs, IADLs    30 min Therapeutic Exercise:  [x]  See flow sheet   Rationale:      increase ROM and increase strength to improve the patients ability to perform ADLs, IADLs     T/o tx. min Patient Education:  YES  Reviewed HEP   []  Progressed/Changed HEP based on: Other Objective/Functional Measures:    Hip flexion with pain. Modified with hip strap and stretch off side of table. Advanced clams to RTB B.  SL hip bridge with pain. Post Treatment Pain Level (on 0 to 10) scale:   2  / 10     ASSESSMENT  Assessment/Changes in Function:   Modified hip flexion with stretching. Advanced to ball bridge. []  See Progress Note/Recertification   Patient will continue to benefit from skilled PT services to modify and progress therapeutic interventions, address functional mobility deficits, address ROM deficits, address strength deficits, analyze and address soft tissue restrictions, analyze and cue movement patterns, analyze and modify body mechanics/ergonomics and assess and modify postural abnormalities to attain remaining goals. Progress toward goals / Updated goals:    Pt with good progress to I with HEP, STG 1.      PLAN  [x]  Upgrade activities as tolerated YES Continue plan of care   []  Discharge due to :    []  Other:      Therapist: Debora Rae PT    Date: 10/25/2017 Time: 12:13pm     Future Appointments  Date Time Provider Noel Lozoya   11/1/2017 9:00 AM Debora Rae PT REHAB [facility]ENTER AT [facility]   11/10/2017 10:30 AM Debora Rae PT RE[facility] AT [facility]   11/15/2017 11:30 AM Nadia Whittaker PT REHAB [facility]ENTER AT [facility]   11/17/2017 10:30 AM Nadia Whittaker PT RE[facility] AT [facility]   11/20/2017 10:30 AM Nadia Whittaker PT RE[facility] AT [facility]   11/22/2017 10:30 AM Nadia Whittaker PT RE[facility] AT [facility]   11/27/2017 10:30 AM Nadia Whittaker PT RE[facility] AT [facility]   11/29/2017 10:30 AM Debora Kyra, PT REHAB CENTER AT [facility]

## 2017-10-31 ENCOUNTER — HOSPITAL ENCOUNTER (OUTPATIENT)
Dept: PHYSICAL THERAPY | Age: 62
Discharge: HOME | End: 2017-10-31
Payer: COMMERCIAL

## 2017-10-31 PROCEDURE — 97140 MANUAL THERAPY 1/> REGIONS: CPT

## 2017-10-31 PROCEDURE — 97110 THERAPEUTIC EXERCISES: CPT

## 2017-11-01 ENCOUNTER — APPOINTMENT (OUTPATIENT)
Dept: PHYSICAL THERAPY | Age: 62
End: 2017-11-01
Payer: COMMERCIAL

## 2017-11-10 ENCOUNTER — APPOINTMENT (OUTPATIENT)
Dept: PHYSICAL THERAPY | Age: 62
End: 2017-11-10
Payer: COMMERCIAL

## 2017-11-13 ENCOUNTER — HOSPITAL ENCOUNTER (OUTPATIENT)
Dept: PHYSICAL THERAPY | Age: 62
Discharge: HOME | End: 2017-11-13
Payer: COMMERCIAL

## 2017-11-13 PROCEDURE — 97110 THERAPEUTIC EXERCISES: CPT

## 2017-11-13 PROCEDURE — 97140 MANUAL THERAPY 1/> REGIONS: CPT

## 2017-11-13 NOTE — PROGRESS NOTES
PHYSICAL THERAPY - DAILY TREATMENT NOTE    Patient Name: Karen Eugene        Date: 2017  : 1955   YES Patient  Verified  Visit #:   5 of   8  Insurance: Payor: Luis Orosco / Plan:  [facility] [address] [facility] / Product Type: Commerical /      In time: 930 Out time: 6882   Total Treatment Time: 70     TREATMENT AREA =  Low back pain [M54.5]  Left hip pain [M25.552]    SUBJECTIVE  Pain Level (on 0 to 10 scale):  2  / 10   Medication Changes/New allergies or changes in medical history, any new surgeries or procedures?    no    If yes, update Summary List   Subjective Functional Status/Changes:  []  No changes reported   Still hard to sit but was able to sit for 30 minutes. Unable to sit on sofa, thinks she will need to get new furniture. Was able to walk 4 miles, average is 2. Has injection tomorrow. Functional impairments:seated activity > 30 minutes. Can't flex hip> 90 degrees. OBJECTIVE  Modalities Rationale:     decrease pain to improve patient's ability to perform ADLs   min [] Estim, type/location:                                      []  att     []  unatt     []  w/US     []  w/ice    []  w/heat    min []  Mechanical Traction: type/lbs                   []  pro   []  sup   []  int   []  cont    []  before manual    []  after manual    min []  Ultrasound, settings/location:      min []  Iontophoresis w/ dexamethasone, location:                                               []  take home patch       []  in clinic   10 min [x]  Ice     []  Heat    location/position: Supine L L/S and R hip    min []  Vasopneumatic Device, press/temp:     min []  Other:    [x] Skin assessment post-treatment (if applicable):    [x]  intact    [x]  redness- no adverse reaction     []redness  adverse reaction:        30 min Manual Therapy: Technique:      [x] S/DTM []IASTM []PROM [] Passive Stretching   [x]manual TPR    [x]Jt manipulation:Gr I [] II []  III [] IV[] V[]  Treatment Area:   Hip PROM after STM to R psoas, hip flexor, L piriformis, ITB L f/b prone ER, IR in prone. Rationale:      decrease pain and increase ROM to improve patient's ability to perform ADLs, IADLs    30 min Therapeutic Exercise:  [x]  See flow sheet   Rationale:      increase ROM and increase strength to improve the patients ability to perform ADLs, IADLs     T/o tx. min Patient Education:  YES  Reviewed HEP   []  Progressed/Changed HEP based on: Other Objective/Functional Measures:  Limited hip flexion ROM with SKTC, PROM > AAROM. Pt with ed on yoga modifications. Hip flexion, ER PROM in supine, prone. Added clams 1-4 without TB   Post Treatment Pain Level (on 0 to 10) scale:   2  / 10     ASSESSMENT  Assessment/Changes in Function:   Added clams 1-4 with minimal pain noted. Pt ed and verb understanding of modifications for yoga postures to reduce hip and LBP. Held supine hip flexor stretch sec to LBP modified to sidelying with PPT. []  See Progress Note/Recertification   Patient will continue to benefit from skilled PT services to modify and progress therapeutic interventions, address functional mobility deficits, address ROM deficits, address strength deficits, analyze and address soft tissue restrictions, analyze and cue movement patterns, analyze and modify body mechanics/ergonomics, assess and modify postural abnormalities and instruct in home and community integration to attain remaining goals. Progress toward goals / Updated goals:  Patient will be educated in stretching, strengthening HEP for B hip mm to improve ADLs. -Pt goal met            PLAN  [x]  Upgrade activities as tolerated YES Continue plan of care   []  Discharge due to :    []  Other:      Therapist: Clarence Telles PT    Date: 11/13/2017 Time: 10:26 am     Future Appointments  Date Time Provider Noel Lozoya   11/15/2017 11:30 AM Nadia Keller PT [facility]   11/17/2017 10:30 AM Clarence Telles PT [facility] AT [facility]   11/20/2017 10:30 AM Nadia Keller, PT REHAB [facility]ENTER AT [facility] 11/22/2017 10:30  [address], PT REHAB CENTER AT [facility]   11/27/2017 10:30  [address], PT REHAB [facility]ENTER AT [facility]   11/29/2017 10:30  [address], PT REHAB [facility]ENTER AT [facility]

## 2017-11-15 ENCOUNTER — APPOINTMENT (OUTPATIENT)
Dept: PHYSICAL THERAPY | Age: 62
End: 2017-11-15
Payer: COMMERCIAL

## 2017-11-16 NOTE — PROGRESS NOTES
PHYSICAL THERAPY - DAILY TREATMENT NOTE    Patient Name: Nelida Kiran        Date: 10/31/2017  : 1955   YES Patient  Verified  Visit #:   4 of   8  Insurance: Payor: Andreina Aggarwal / Plan: [address] [place] [address] Rd PT / Product Type: Commerical /      In time: 6 Out time: 1130   Total Treatment Time: 60     TREATMENT AREA =  Low back pain [M54.5]  Left hip pain [M25.552]    SUBJECTIVE  Pain Level (on 0 to 10 scale):  2  / 10   Medication Changes/New allergies or changes in medical history, any new surgeries or procedures?    no    If yes, update Summary List   Subjective Functional Status/Changes:  []  No changes reported   Bought a \"huge\" ice pack. Functional improvements:  Pt with increased seated tolerance, just came from getting injections this am.  Has been doing her exercises, back to yoga modified. Functional impairments:seated activity > 30 minutes. OBJECTIVE  Modalities Rationale:     decrease pain to improve patient's ability to perform ADLs   min [] Estim, type/location:                                      []  att     []  unatt     []  w/US     []  w/ice    []  w/heat    min []  Mechanical Traction: type/lbs                   []  pro   []  sup   []  int   []  cont    []  before manual    []  after manual    min []  Ultrasound, settings/location:      min []  Iontophoresis w/ dexamethasone, location:                                               []  take home patch       []  in clinic   10 min [x]  Ice     []  Heat    location/position: Supine L L/S and R hip    min []  Vasopneumatic Device, press/temp:     min []  Other:    [x] Skin assessment post-treatment (if applicable):    [x]  intact    [x]  redness- no adverse reaction     []redness  adverse reaction:        20 min Manual Therapy: Technique:      [x] S/DTM []IASTM []PROM [] Passive Stretching   [x]manual TPR    [x]Jt manipulation:Gr I [] II []  III [] IV[] V[]  Treatment Area:   Hip stretching with strap with mobility with movement,  STM to R psoas, hip flexor, L piriformis, ITB L. Rationale:      decrease pain and increase ROM to improve patient's ability to perform ADLs, IADLs    30 min Therapeutic Exercise:  [x]  See flow sheet   Rationale:      increase ROM and increase strength to improve the patients ability to perform ADLs, IADLs     T/o tx. min Patient Education:  YES  Reviewed HEP   []  Progressed/Changed HEP based on: Other Objective/Functional Measures:  Limited hip flexion ROM with SKTC and ER with figure 4. Pt at 110 hip flexion R, ER to 30 L. Modified bridges to increase level of difficulty. Post Treatment Pain Level (on 0 to 10) scale:   2  / 10     ASSESSMENT  Assessment/Changes in Function:   Added bridges with heel raise and toe raise, hip adduction. Post tx to 130 R hip flexion, ER to 40 degrees post manual activity. []  See Progress Note/Recertification   Patient will continue to benefit from skilled PT services to modify and progress therapeutic interventions, address functional mobility deficits, address ROM deficits, address strength deficits, analyze and address soft tissue restrictions, analyze and cue movement patterns, analyze and modify body mechanics/ergonomics and assess and modify postural abnormalities to attain remaining goals. Progress toward goals / Updated goals:  Patient will report >= 25% improvement in standing and sitting for 15 minutes for ADLs--Pt goal met, 30 minutes currently.     Pt with excellent progress in ROM, pain goal.     PLAN  [x]  Upgrade activities as tolerated YES Continue plan of care   []  Discharge due to :    []  Other:      Therapist: Sridevi Nicholson PT    Date: 10/31/2017 Time: 11:23 am     Future Appointments  Date Time Provider Noel Lozoya   10/31/2017 10:30 AM Sridevi Nicholson PT [facility] AT [facility]   11/13/2017 9:30 AM Nadia Terrell, PT [facility] AT [facility]   11/15/2017 11:30 AM Nadia Terrell PT [facility]   11/17/2017 10:30 AM Sridevi Nicholson, PT [facility] AT [facility]   11/20/2017 10:30 AM Sridevi Nicholson PT REHAB [facility]ENTER AT [facility]   11/22/2017 10:30  [address], PT REHAB CENTER AT [facility]   11/27/2017 10:30  [address], PT REHAB [facility]ENTER AT [facility]   11/29/2017 10:30 AM Nadia Aragon, PT REHAB CENTER AT [facility] no

## 2017-11-17 ENCOUNTER — HOSPITAL ENCOUNTER (OUTPATIENT)
Dept: PHYSICAL THERAPY | Age: 62
Discharge: HOME | End: 2017-11-17
Payer: COMMERCIAL

## 2017-11-17 PROCEDURE — 97110 THERAPEUTIC EXERCISES: CPT

## 2017-11-17 PROCEDURE — 97140 MANUAL THERAPY 1/> REGIONS: CPT

## 2017-11-17 NOTE — PROGRESS NOTES
PHYSICAL THERAPY - DAILY TREATMENT NOTE    Patient Name: Pily Merchant        Date: 2017  : 1955   YES Patient  Verified  Visit #:   Insurance: Payor: Ladan Telles / [address] / Product Type: Commerical /      In time: 1081 Out time: 1130   Total Treatment Time: 60     TREATMENT AREA =  Low back pain [M54.5]  Left hip pain [M25.552]    SUBJECTIVE  Pain Level (on 0 to 10 scale):  2  / 10   Medication Changes/New allergies or changes in medical history, any new surgeries or procedures?    no    If yes, update Summary List   Subjective Functional Status/Changes:  []  No changes reported   Feeling some progress with her activity in water, standing, walking. Functional impairments:seated activity > 30 minutes. Can't flex hip> 95 degrees. OBJECTIVE  Modalities Rationale:     decrease pain to improve patient's ability to perform ADLs   min [] Estim, type/location:                                      []  att     []  unatt     []  w/US     []  w/ice    []  w/heat    min []  Mechanical Traction: type/lbs                   []  pro   []  sup   []  int   []  cont    []  before manual    []  after manual    min []  Ultrasound, settings/location:      min []  Iontophoresis w/ dexamethasone, location:                                               []  take home patch       []  in clinic   10 min [x]  Ice     []  Heat    location/position: Supine L L/S and R hip    min []  Vasopneumatic Device, press/temp:     min []  Other:    [x] Skin assessment post-treatment (if applicable):    [x]  intact    [x]  redness- no adverse reaction     []redness  adverse reaction:        15 min Manual Therapy: Technique:      [x] S/DTM []IASTM []PROM [] Passive Stretching   [x]manual TPR    [x]Jt manipulation:Gr I [] II []  III [] IV[] V[]  Treatment Area: Hip PROM after STM to R psoas, hip flexor, L piriformis, ITB L f/b prone ER, IR in prone.    Rationale:      decrease pain and increase ROM to improve patient's ability to perform ADLs, IADLs    30 min Therapeutic Exercise:  [x]  See flow sheet   Rationale:      increase ROM and increase strength to improve the patients ability to perform ADLs, IADLs     T/o tx. min Patient Education:  YES  Reviewed HEP   []  Progressed/Changed HEP based on: Other Objective/Functional Measures: Added theraband to clams 1-4. Post Treatment Pain Level (on 0 to 10) scale:   2  / 10     ASSESSMENT  Assessment/Changes in Function:   Advanced theraband to clams 1-4. []  See Progress Note/Recertification   Patient will continue to benefit from skilled PT services to modify and progress therapeutic interventions, address functional mobility deficits, address ROM deficits, address strength deficits, analyze and address soft tissue restrictions, analyze and cue movement patterns, analyze and modify body mechanics/ergonomics, assess and modify postural abnormalities and instruct in home and community integration to attain remaining goals. Progress toward goals / Updated goals:     1. Patient will report >= 25% improvement in standing and sitting for 15 minutes for ADLs--met and exceeded. 2. Patient will be educated in stretching, strengthening HEP for B hip mm to improve ADLs--met and exceeded.      PLAN  [x]  Upgrade activities as tolerated YES Continue plan of care   []  Discharge due to :    []  Other:      Therapist: Jered Diallo PT    Date: 11/17/2017 Time: 11:19 am     Future Appointments  Date Time Provider Noel Lozoya   11/20/2017 10:30 AM Jered Diallo PT [facility] AT [facility]   11/22/2017 10:30 AM Beth Pinkie Skiff, PT [facility]ENTER AT [facility]   11/27/2017 10:30 AM Jered Diallo PT [facility] AT [facility]   11/29/2017 10:30 AM Jered Diallo, PT RE[facility]ENTER AT [facility]

## 2017-11-20 ENCOUNTER — HOSPITAL ENCOUNTER (OUTPATIENT)
Dept: PHYSICAL THERAPY | Age: 62
Discharge: HOME | End: 2017-11-20
Payer: COMMERCIAL

## 2017-11-20 PROCEDURE — 97110 THERAPEUTIC EXERCISES: CPT

## 2017-11-20 PROCEDURE — 97140 MANUAL THERAPY 1/> REGIONS: CPT

## 2017-11-20 NOTE — PROGRESS NOTES
PHYSICAL THERAPY - DAILY TREATMENT NOTE      Patient Name: Roby Pratt        Date: 2017  : 1955   YES Patient  Verified  Visit #:     Insurance: Payor: Gilda Tim / Plan: [address] / Product Type: Commerical /      In time: 53 Out time: 1130   Total Treatment Time: 55     Medicare Time Tracking (below)   Total Timed Codes (min):   1:1 Treatment Time:       TREATMENT AREA =  Low back pain [M54.5]  Left hip pain [M25.552]    SUBJECTIVE    Pain Level (on 0 to 10 scale):  5  / 10   Medication Changes/New allergies or changes in medical history, any new surgeries or procedures? NO    If yes, update Summary List   Subjective Functional Status/Changes:  []  No changes reported       Functional improvements: pt able to do more sitting and noting more time. Functional impairments: 20-30 min sitting before she has to move.          OBJECTIVE  Modalities Rationale:     decrease inflammation and decrease pain to improve patient's ability to perform ADLs   min [] Estim, type/location:                                      []  att     []  unatt     []  w/US     []  w/ice    []  w/heat    min []  Mechanical Traction: type/lbs                   []  pro   []  sup   []  int   []  cont    []  before manual    []  after manual    min []  Ultrasound, settings/location:      min []  Iontophoresis w/ dexamethasone, location:                                               []  take home patch       []  in clinic   10 min [x]  Ice     []  Heat    location/position: Supine to back    min []  Vasopneumatic Device, press/temp:     min []  Other:    [x] Skin assessment post-treatment (if applicable):    [x]  intact    []  redness- no adverse reaction     [x]redness  adverse reaction:      30 min Therapeutic Exercise:  [x]  See flow sheet   Rationale:      increase ROM and increase strength to improve the patients ability to perform ADLs, IADLs       15 min Manual Therapy: Technique:      [x] S/DTM []IASTM []PROM [] Passive Stretching   [x]manual TPR    [x]Jt manipulation:Gr I [] II []  III [] IV[] V[]  Treatment Area:  R quad, L  Glutes, IASTM to post glutes, R quad   Rationale:      decrease pain and increase ROM to improve patient's ability to perform ADLs       min Gait Training:    Rationale:        throughout therapy min Patient Education:  YES  Reviewed HEP   []  Progressed/Changed HEP based on: Other Objective/Functional Measures:    Advanced TB to clams 1-4   Post Treatment Pain Level (on 0 to 10) scale:   2  / 10     ASSESSMENT    Assessment/Changes in Function:     Improved hip ROM with SKTC. []  See Progress Note/Recertification   Patient will continue to benefit from skilled PT services to modify and progress therapeutic interventions, address functional mobility deficits, address ROM deficits, address strength deficits and analyze and address soft tissue restrictions to attain remaining goals. Progress toward goals / Updated goals:    Pt STGs met at this time, working well toward [address]      PLAN    []  Upgrade activities as tolerated YES Continue plan of care   []  Discharge due to :    []  Other:      Therapist: Kristina Sharma PT    Date: 11/20/2017 Time: 10:36 AM   Future Appointments  Date Time Provider Noel Lozoya   11/22/2017 10:30 AM Kristina Sharma PT [facility] AT [facility]   11/27/2017 10:30 AM Kristina Sharma PT [facility]   11/29/2017 10:30 AM Nadia Centeno PT RE[facility] AT [facility]

## 2017-11-22 ENCOUNTER — APPOINTMENT (OUTPATIENT)
Dept: PHYSICAL THERAPY | Age: 62
End: 2017-11-22
Payer: COMMERCIAL

## 2017-11-27 ENCOUNTER — HOSPITAL ENCOUNTER (OUTPATIENT)
Dept: PHYSICAL THERAPY | Age: 62
Discharge: HOME | End: 2017-11-27
Payer: COMMERCIAL

## 2017-11-27 PROCEDURE — 97140 MANUAL THERAPY 1/> REGIONS: CPT

## 2017-11-27 PROCEDURE — 97110 THERAPEUTIC EXERCISES: CPT

## 2017-11-27 NOTE — PROGRESS NOTES
PHYSICAL THERAPY - DAILY TREATMENT NOTE      Patient Name: Sri House        Date: 2017  : 1955   YES Patient  Verified  Visit #:     Insurance: Payor: Chauncey Reyes / Plan: [address] / Product Type: Commerical /      In time:  Out time: 1130   Total Treatment Time: 55     Medicare Time Tracking (below)   Total Timed Codes (min):   1:1 Treatment Time:       TREATMENT AREA =  Low back pain [M54.5]  Left hip pain [M25.552]    SUBJECTIVE    Pain Level (on 0 to 10 scale):  5  / 10   Medication Changes/New allergies or changes in medical history, any new surgeries or procedures? NO    If yes, update Summary List   Subjective Functional Status/Changes:  []  No changes reported       Functional improvements: R hip is sore, sitting now at 60 minutes with less pain. Functional impairments: 10 min pain in back and R hip. Able to get back to yoga after Thanksgiving. Did a lot more activity than normal, used ice as well to help.        OBJECTIVE  Modalities Rationale:     decrease inflammation and decrease pain to improve patient's ability to perform ADLs   min [] Estim, type/location:                                      []  att     []  unatt     []  w/US     []  w/ice    []  w/heat    min []  Mechanical Traction: type/lbs                   []  pro   []  sup   []  int   []  cont    []  before manual    []  after manual    min []  Ultrasound, settings/location:      min []  Iontophoresis w/ dexamethasone, location:                                               []  take home patch       []  in clinic   10 min [x]  Ice     []  Heat    location/position: Supine to back    min []  Vasopneumatic Device, press/temp:     min []  Other:    [x] Skin assessment post-treatment (if applicable):    [x]  intact    []  redness- no adverse reaction     [x]redness  adverse reaction:      30 min Therapeutic Exercise:  [x]  See flow sheet   Rationale:      increase ROM and increase strength to improve the patients ability to perform ADLs, IADLs       15 min Manual Therapy: Technique:      [x] S/DTM []IASTM []PROM [] Passive Stretching   [x]manual TPR    [x]Jt manipulation:Gr I [] II []  III [] IV[] V[]  Treatment Area:  R IASTM to quad, post lumbar f/b hip IR, ER in prone. Rationale:      decrease pain and increase ROM to improve patient's ability to perform ADLs    throughout therapy min Patient Education:  YES  Reviewed HEP   []  Progressed/Changed HEP based on: Other Objective/Functional Measures:    Pt yoga and pool exercise progression ed. Limited hip flexion to 90, improved to 115 after tx. Post Treatment Pain Level (on 0 to 10) scale:   2  / 10     ASSESSMENT    Assessment/Changes in Function:     Improved hip ROM with SKTC. []  See Progress Note/Recertification   Patient will continue to benefit from skilled PT services to modify and progress therapeutic interventions, address functional mobility deficits, address ROM deficits, address strength deficits and analyze and address soft tissue restrictions to attain remaining goals. Progress toward goals / Updated goals:    Pt STGs met at this time, working well toward [address]      PLAN    []  Upgrade activities as tolerated YES Continue plan of care   []  Discharge due to :    []  Other:      Therapist: Seth Prajapati PT    Date: 11/27/2017 Time: 10:36 AM     Future Appointments  Date Time Provider Noel Lozoya   11/29/2017 10:30 AM Seth Prajapati, PT [facility] AT [facility]   12/4/2017 11:00 AM Naida Morgan, PT RE[facility] AT [facility]

## 2017-11-29 ENCOUNTER — HOSPITAL ENCOUNTER (OUTPATIENT)
Dept: PHYSICAL THERAPY | Age: 62
Discharge: HOME | End: 2017-11-29
Payer: COMMERCIAL

## 2017-11-29 PROCEDURE — 97110 THERAPEUTIC EXERCISES: CPT

## 2017-11-29 PROCEDURE — 97140 MANUAL THERAPY 1/> REGIONS: CPT

## 2017-11-29 NOTE — PROGRESS NOTES
[address] PHYSICAL THERAPY AT [address], [address] [place] [address], [address] [facility], [address] [place] [address]  Phone: [phone]  Fax: [phone]  PROGRESS NOTE  Patient Name: Roby Pratt : 1955   Treatment/Medical Diagnosis: Low back pain [M54.5]  Left hip pain [M25.552]   Referral Source: Isidro Leggett MD     Date of Initial Visit: 10/12/17 Attended Visits: 7 Missed Visits: 0     SUMMARY OF TREATMENT  Roby Pratt has been seen at our clinic 2-3x/wk for a total of 7 visits. Pt treatment has consisted of herapeutic exercise for lumbar ROM, hip/core strengthening, and manual therapy. CURRENT STATUS  Pt has had a good response to physical therapy treatment. She reports improved ability to sit longer up to 1 hour. Pt also reports standing and walking longer and with less pain. Pt has returned to modified yoga classes and aquatic classes without increased pain. Pt  continues to complain of difficulty and increased pain on R anterior hip. States she has had this area painful for 10 years. As her L gluteal region and LBP improved, she has noticed R hip pain limiting her progress. Previous Goals:  1. Patient will report >= 25% improvement in standing and sitting for 15 minutes for ADLs. 2. Patient will be educated in stretching, strengthening HEP for B hip mm to improve ADLs. 3. Patient will demonstrate improved FOTO score to 48 to indicate improved function. Prior Level/Current Level:  1) Prior Level: pt unable to sit for 5 min   Current Level: Pt able to sit 1 hr.   Goal Met? yes  2) Prior Level:n/a   Current Level: Pt I and compliant. Goal Met? yes  3) Prior Level: FOTO limited   Current Level: 48   Goal Met? yes      New Goals to be achieved in __4-6__  treatments:  1. Patient to report >= 75% improvement in symptoms with seating and standing duration to 30 minutes for IADLs.   2. Patient will be independent with finalized HEP/ self maintenance to improve long-term outcomes. 3. ncrease FOTO score >=  57 to indicate improved function with use of B LE.  4 Restore full AROM for improved ADL participation.       RECOMMENDATIONS  Pt with improved overall symptoms in L gluteal region, however, limited now by R hip pain. Please advise re: tx for R hip flexor, quadriceps dysfunction. Pt would benefit from soft tissue mobility and joint mobility for R hip improvement. Specifics: 1-2x/wk x 4 more wks    If you have any questions/comments please contact us directly at [phone]. Thank you for allowing us to assist in the care of your patient. Therapist Signature: Ady Ramos, PT Date: 11/29/2017     Time: 11:09 AM   NOTE TO PHYSICIAN:  PLEASE COMPLETE THE ORDERS BELOW AND FAX TO   [facility]: [phone]. If you are unable to process this request in 24 hours please contact our office: [phone].    ___ I have read the above report and request that my patient continue as recommended.   ___ I have read the above report and request that my patient continue therapy with the following changes/special instructions:_________________________________________________________   ___ I have read the above report and request that my patient be discharged from therapy.      Physician Signature:        Date:       Time:

## 2017-11-29 NOTE — PROGRESS NOTES
PHYSICAL THERAPY - DAILY TREATMENT NOTE      Patient Name: Sharmila Pineda        Date: 2017  : 1955   YES Patient  Verified  Visit #:     Insurance: Payor: Timur Smith / Plan: [address] [facility] [address] PT / Product Type: Commerical /      In time:  Out time: 1130   Total Treatment Time: 55     Medicare Time Tracking (below)   Total Timed Codes (min):   1:1 Treatment Time:       TREATMENT AREA =  Low back pain [M54.5]  Left hip pain [M25.552]    SUBJECTIVE    Pain Level (on 0 to 10 scale): 3/ 10   Medication Changes/New allergies or changes in medical history, any new surgeries or procedures? NO    If yes, update Summary List   Subjective Functional Status/Changes:  []  No changes reported       Functional improvements: pt able to do more sitting and up to 60 min now. Functional impairments: 1 hr sitting, 30 min standing before pain.          OBJECTIVE  Modalities Rationale:     decrease inflammation and decrease pain to improve patient's ability to perform ADLs   min [] Estim, type/location:                                      []  att     []  unatt     []  w/US     []  w/ice    []  w/heat    min []  Mechanical Traction: type/lbs                   []  pro   []  sup   []  int   []  cont    []  before manual    []  after manual    min []  Ultrasound, settings/location:      min []  Iontophoresis w/ dexamethasone, location:                                               []  take home patch       []  in clinic   10 min [x]  Ice     []  Heat    location/position: Supine to back    min []  Vasopneumatic Device, press/temp:     min []  Other:    [x] Skin assessment post-treatment (if applicable):    [x]  intact    []  redness- no adverse reaction     [x]redness  adverse reaction:      30 min Therapeutic Exercise:  [x]  See flow sheet   Rationale:      increase ROM and increase strength to improve the patients ability to perform ADLs, IADLs       15 min Manual Therapy: Technique:      [x] S/DTM []IASTM []PROM [] Passive Stretching   [x]manual TPR    [x]Jt manipulation:Gr I [] II []  III [] IV[] V[]  Treatment Area:  R quad, L  Glutes, IASTM to post glutes, R quad   Rationale:      decrease pain and increase ROM to improve patient's ability to perform ADLs       min Gait Training:    Rationale:        throughout therapy min Patient Education:  YES  Reviewed HEP   []  Progressed/Changed HEP based on: Other Objective/Functional Measures:    See PN. Post Treatment Pain Level (on 0 to 10) scale:   2  / 10     ASSESSMENT    Assessment/Changes in Function:     See PN.     []  See Progress Note/Recertification   Patient will continue to benefit from skilled PT services to modify and progress therapeutic interventions, address functional mobility deficits, address ROM deficits, address strength deficits, analyze and address soft tissue restrictions, analyze and cue movement patterns and assess and modify postural abnormalities to attain remaining goals. Progress toward goals / Updated goals:    Pt STGs met at this time, working well toward [address]      PLAN    []  Upgrade activities as tolerated YES Continue plan of care   []  Discharge due to :    []  Other:      Therapist: Juan Rodriguez PT    Date: 11/29/2017 Time: 10:36 AM     Future Appointments  Date Time Provider Noel Lozoya   12/6/2017 11:00 AM Juan Rodriguez PT [facility] AT [facility]

## 2017-12-04 ENCOUNTER — APPOINTMENT (OUTPATIENT)
Dept: PHYSICAL THERAPY | Age: 62
End: 2017-12-04
Payer: COMMERCIAL

## 2017-12-06 ENCOUNTER — HOSPITAL ENCOUNTER (OUTPATIENT)
Dept: PHYSICAL THERAPY | Age: 62
Discharge: HOME | End: 2017-12-06
Payer: COMMERCIAL

## 2017-12-06 PROCEDURE — 97140 MANUAL THERAPY 1/> REGIONS: CPT

## 2017-12-06 PROCEDURE — 97110 THERAPEUTIC EXERCISES: CPT

## 2017-12-06 NOTE — PROGRESS NOTES
PHYSICAL THERAPY - DAILY TREATMENT NOTE      Patient Name: Jocelyn Velazquez        Date: 2017  : 1955   YES Patient  Verified  Visit #:  10/  12  Insurance: Payor: Nile Carroll / Plan: [address] PT / Product Type: Commerical /      In time: 1489 Out time: 1210   Total Treatment Time: 65     Medicare Time Tracking (below)   Total Timed Codes (min):   1:1 Treatment Time:       TREATMENT AREA =  Low back pain [M54.5]  Left hip pain [M25.552]    SUBJECTIVE    Pain Level (on 0 to 10 scale): 3/ 10   Medication Changes/New allergies or changes in medical history, any new surgeries or procedures? NO    If yes, update Summary List   Subjective Functional Status/Changes:  []  No changes reported       Functional improvements: able to perform stretches and HEP as indicated. No climbing per MD secondary to irritation to R hip. MD pleased with progress.          OBJECTIVE  Modalities Rationale:     decrease inflammation and decrease pain to improve patient's ability to perform ADLs   min [] Estim, type/location:                                      []  att     []  unatt     []  w/US     []  w/ice    []  w/heat    min []  Mechanical Traction: type/lbs                   []  pro   []  sup   []  int   []  cont    []  before manual    []  after manual    min []  Ultrasound, settings/location:      min []  Iontophoresis w/ dexamethasone, location:                                               []  take home patch       []  in clinic   10 min [x]  Ice     []  Heat    location/position: Supine to back    min []  Vasopneumatic Device, press/temp:     min []  Other:    [x] Skin assessment post-treatment (if applicable):    [x]  intact    []  redness- no adverse reaction     [x]redness  adverse reaction:      30 min Therapeutic Exercise:  [x]  See flow sheet   Rationale:      increase ROM and increase strength to improve the patients ability to perform ADLs, IADLs       25 min Manual Therapy: Technique:      [x] S/DTM []IASTM []PROM [] Passive Stretching   [x]manual TPR    [x]Jt manipulation:Gr I [] II []  III [] IV[] V[]  Treatment Area:  R quad specifically rectus femoris, L  Glutes, DTM to piriformis, STM post glutes, R quad   Rationale:      decrease pain and increase ROM to improve patient's ability to perform ADLs    throughout therapy min Patient Education:  YES  Reviewed HEP   []  Progressed/Changed HEP based on: Other Objective/Functional Measures:    Pt program modified to include rectus femoris stretching. Post Treatment Pain Level (on 0 to 10) scale:   1  / 10     ASSESSMENT    Assessment/Changes in Function:   HEP updated and pt demonstrated understanding of update: Added ball for crunches, DKTC, bridges with feet on ball.       []  See Progress Note/Recertification   Patient will continue to benefit from skilled PT services to modify and progress therapeutic interventions, address functional mobility deficits, address ROM deficits, address strength deficits, analyze and address soft tissue restrictions and analyze and cue movement patterns to attain remaining goals. Progress toward goals / Updated goals:    Pt working well toward updated LTGs     PLAN    [x]  Upgrade activities as tolerated YES Continue plan of care   []  Discharge due to :    []  Other:      Therapist: Cedric Perez PT    Date: 12/6/2017 Time: 11:36 AM     No future appointments.

## 2017-12-11 ENCOUNTER — HOSPITAL ENCOUNTER (OUTPATIENT)
Dept: PHYSICAL THERAPY | Age: 62
Discharge: HOME | End: 2017-12-11
Payer: COMMERCIAL

## 2017-12-11 PROCEDURE — 97140 MANUAL THERAPY 1/> REGIONS: CPT

## 2017-12-11 PROCEDURE — 97110 THERAPEUTIC EXERCISES: CPT

## 2017-12-11 NOTE — PROGRESS NOTES
PHYSICAL THERAPY - DAILY TREATMENT NOTE      Patient Name: Gómez Metcalf        Date: 2017  : 1955   YES Patient  Verified  Visit #:    Insurance: Payor: Jina Sigala / Plan: [address] / Product Type: Commerical /      In time: 235 Out time: 335   Total Treatment Time: 60     Medicare Time Tracking (below)   Total Timed Codes (min):   1:1 Treatment Time:       TREATMENT AREA =  Low back pain [M54.5]  Left hip pain [M25.552]    SUBJECTIVE    Pain Level (on 0 to 10 scale): 3/ 10   Medication Changes/New allergies or changes in medical history, any new surgeries or procedures? NO    If yes, update Summary List   Subjective Functional Status/Changes:  []  No changes reported       Functional improvements: R hip and L lumbar spine improving. Noticing pain with lifting leg.          OBJECTIVE  Modalities Rationale:     decrease inflammation and decrease pain to improve patient's ability to perform ADLs   min [] Estim, type/location:                                      []  att     []  unatt     []  w/US     []  w/ice    []  w/heat    min []  Mechanical Traction: type/lbs                   []  pro   []  sup   []  int   []  cont    []  before manual    []  after manual    min []  Ultrasound, settings/location:      min []  Iontophoresis w/ dexamethasone, location:                                               []  take home patch       []  in clinic   10 min [x]  Ice     []  Heat    location/position: Supine to back    min []  Vasopneumatic Device, press/temp:     min []  Other:    [x] Skin assessment post-treatment (if applicable):    [x]  intact    []  redness- no adverse reaction     [x]redness  adverse reaction:      30 min Therapeutic Exercise:  [x]  See flow sheet   Rationale:      increase ROM and increase strength to improve the patients ability to perform ADLs, IADLs       15 min Manual Therapy: Technique:      [x] S/DTM []IASTM []PROM [] Passive Stretching   [x]manual TPR    [x]Jt manipulation:Gr I [] II []  III [] IV[] V[]  Treatment Area:  R quad emphasis on rectus femoris, L  Gluteals, STM, DTM quad   Rationale:      decrease pain and increase ROM to improve patient's ability to perform ADLs    throughout therapy min Patient Education:  YES  Reviewed HEP   []  Progressed/Changed HEP based on: Other Objective/Functional Measures:    Pt program modified to include rectus femoris strengthening. Post Treatment Pain Level (on 0 to 10) scale:   1  / 10     ASSESSMENT    Assessment/Changes in Function:   HEP updated and pt demonstrated understanding of update: Added QS, SAQ 2 sets of 10.       []  See Progress Note/Recertification   Patient will continue to benefit from skilled PT services to modify and progress therapeutic interventions, address functional mobility deficits, address ROM deficits, address strength deficits, analyze and address soft tissue restrictions and analyze and cue movement patterns to attain remaining goals.       Progress toward goals / Updated goals:    Pt working well toward updated LTGs     PLAN    [x]  Upgrade activities as tolerated YES Continue plan of care   []  Discharge due to :    []  Other:      Therapist: Carlos Hernandez PT    Date: 12/11/2017 Time: 4:03pm     Future Appointments  Date Time Provider Noel Lozoya   12/13/2017 2:30 PM Carlos Hernandez PT RE[facility] AT [facility]   12/18/2017 3:00 PM Carlos Hernandez, PT RE[facility]ENTER AT [facility]   12/20/2017 11:30 AM Nadia Turcios PT RE[facility]ENTER AT [facility]   12/28/2017 12:00 PM Carlos Hernandez, PT REH[facility]ENTER AT [facility]

## 2017-12-13 ENCOUNTER — HOSPITAL ENCOUNTER (OUTPATIENT)
Dept: PHYSICAL THERAPY | Age: 62
Discharge: HOME | End: 2017-12-13
Payer: COMMERCIAL

## 2017-12-13 PROCEDURE — 97140 MANUAL THERAPY 1/> REGIONS: CPT

## 2017-12-13 PROCEDURE — 97110 THERAPEUTIC EXERCISES: CPT

## 2017-12-13 NOTE — PROGRESS NOTES
PHYSICAL THERAPY - DAILY TREATMENT NOTE      Patient Name: Karen Eugene        Date: 2017  : 1955   YES Patient  Verified  Visit #:  12/15 Insurance: Payor: Luis Coastsylvia / Plan:  [address] / Product Type: Commerical /      In time: 230 Out time: 325   Total Treatment Time: 55     Medicare Time Tracking (below)   Total Timed Codes (min):   1:1 Treatment Time:       TREATMENT AREA =  Low back pain [M54.5]  Left hip pain [M25.552]    SUBJECTIVE    Pain Level (on 0 to 10 scale): 3/ 10   Medication Changes/New allergies or changes in medical history, any new surgeries or procedures? NO    If yes, update Summary List   Subjective Functional Status/Changes:  []  No changes reported       Functional improvements: R hip is less painful after injection. Able to do water aerobics this am at lower intensity.          OBJECTIVE  Modalities Rationale:     decrease inflammation and decrease pain to improve patient's ability to perform ADLs   min [] Estim, type/location:                                      []  att     []  unatt     []  w/US     []  w/ice    []  w/heat    min []  Mechanical Traction: type/lbs                   []  pro   []  sup   []  int   []  cont    []  before manual    []  after manual    min []  Ultrasound, settings/location:      min []  Iontophoresis w/ dexamethasone, location:                                               []  take home patch       []  in clinic   10 min [x]  Ice     []  Heat    location/position: Supine to back    min []  Vasopneumatic Device, press/temp:     min []  Other:    [x] Skin assessment post-treatment (if applicable):    [x]  intact    []  redness- no adverse reaction     [x]redness  adverse reaction:      25 min Therapeutic Exercise:  [x]  See flow sheet   Rationale:      increase ROM and increase strength to improve the patients ability to perform ADLs, IADLs       20 min Manual Therapy: Technique:      [x] S/DTM []IASTM [x]PROM [] Passive Stretching [x]manual TPR    [x]Jt manipulation:Gr I [] II []  III [] IV[] V[]  Treatment Area:  R quad CFM, STM R piriformis   Rationale:      decrease pain and increase ROM to improve patient's ability to perform ADLs    throughout therapy min Patient Education:  YES  Reviewed HEP   []  Progressed/Changed HEP based on: Other Objective/Functional Measures:    Pt program modified to include rectus femoris stretching, strengthening and updated for hip flexor stretching. Post Treatment Pain Level (on 0 to 10) scale:   1  / 10     ASSESSMENT    Assessment/Changes in Function:   HEP updated to include LTR stretch. []  See Progress Note/Recertification   Patient will continue to benefit from skilled PT services to modify and progress therapeutic interventions, address functional mobility deficits, address ROM deficits, address strength deficits, analyze and address soft tissue restrictions and analyze and cue movement patterns to attain remaining goals. Progress toward goals / Updated goals:  Pt has met LTG 1 with standing and sitting to 75% improvement. Pt with increased ROM per updated PN to 110 degrees.      PLAN    [x]  Upgrade activities as tolerated YES Continue plan of care   []  Discharge due to :    [x]  Other: Pt with good progress to pain control goal.     Therapist: Claudette Gray, PT    Date: 12/13/2017 Time: 4:03pm     Future Appointments  Date Time Provider Noel Lozoya   12/18/2017 3:00 PM Claudette Gray, PT [facility] AT [facility]   12/20/2017 11:30 AM Nadia Gordillo PT [facility] AT [facility]   12/28/2017 12:00 PM Claudette Gray, PT RE[facility]AB [facility]ENTER AT [facility]

## 2017-12-18 ENCOUNTER — HOSPITAL ENCOUNTER (OUTPATIENT)
Dept: PHYSICAL THERAPY | Age: 62
Discharge: HOME | End: 2017-12-18
Payer: COMMERCIAL

## 2017-12-18 PROCEDURE — 97140 MANUAL THERAPY 1/> REGIONS: CPT

## 2017-12-18 PROCEDURE — 97110 THERAPEUTIC EXERCISES: CPT

## 2017-12-18 NOTE — PROGRESS NOTES
PHYSICAL THERAPY - DAILY TREATMENT NOTE    Patient Name: Roby Pratt        Date: 2017  : 1955   YES Patient  Verified  Visit #:  13*   of   15  Insurance: Payor: Gilda Tim / Plan: [address] / Product Type: Commerical /      In time: 3 Out time: 4   Total Treatment Time: 50     TREATMENT AREA =  Low back pain [M54.5]  Left hip pain [M25.552]    SUBJECTIVE  Pain Level (on 0 to 10 scale):  3  / 10   Medication Changes/New allergies or changes in medical history, any new surgeries or procedures? NO    If yes, update Summary List   Subjective Functional Status/Changes:  []  No changes reported     Functional improvements: Pt with improvements x 1 day without pain. Notes significant improvements overall. Functional impairments: Limited by R anterior hip pain, seeing MD this week for follow up.        OBJECTIVE  Modalities Rationale:     decrease edema and decrease inflammation to improve patient's ability to perform ADLs   min [] Estim, type/location:                                      []  att     []  unatt     []  w/US     []  w/ice    []  w/heat    min []  Mechanical Traction: type/lbs                   []  pro   []  sup   []  int   []  cont    []  before manual    []  after manual    min []  Ultrasound, settings/location:      min []  Iontophoresis w/ dexamethasone, location:                                               []  take home patch       []  in clinic   10 min [x]  Ice     []  Heat    location/position: Supine to R hip and posterior L hip    min []  Vasopneumatic Device, press/temp:     min []  Other:    [x] Skin assessment post-treatment (if applicable):    []  intact    []  redness- no adverse reaction     []redness  adverse reaction:        20 min Manual Therapy: Technique:      [x] S/DTM []IASTM []PROM [x] Passive Stretching   [x]manual TPR    []Jt manipulation:Gr I [] II []  III [] IV[] V[]  Treatment Area:  L posterior hip, R sartorius, hip flexor   Rationale: decrease pain, increase ROM and increase tissue extensibility to improve patient's ability to stand, sit and walk for longer periods    25 min Therapeutic Exercise:  [x]  See flow sheet   Rationale:      increase ROM, increase strength and improve balance to improve the patients ability to perform ADLs, IADLS. throughout therapy min Patient Education:  YES  Reviewed HEP   []  Progressed/Changed HEP based on: Other Objective/Functional Measures:    Pt program modified to progress hip abd, extension firing pattern exercises for carryover into function. Post Treatment Pain Level (on 0 to 10) scale:  2  / 10     ASSESSMENT  Assessment/Changes in Function:   Pt able to perform H/L and S/L hip abduction exercises without hip pain after tx.     []  See Progress Note/Recertification   Patient will continue to benefit from skilled PT services to modify and progress therapeutic interventions, address functional mobility deficits, address ROM deficits and address strength deficits to attain remaining goals. Progress toward goals / Updated goals:  1. Patient to report >= 75% improvement in symptoms with seating and standing duration to 30 minutes for IADLs--met  Pt with increased progress to I with HEP. Plan for 1x/week x 2 weeks to progress to I for long-term management of symptoms.      PLAN  [x]  Upgrade activities as tolerated YES Continue plan of care   []  Discharge due to :    []  Other:      Therapist: Olga Gutierrez PT    Date: 12/18/2017 Time: 3:35 PM     Future Appointments  Date Time Provider Noel Lozoya   12/20/2017 10:00 AM Olga Gutierrez PT [facility] AT [facility]   12/28/2017 12:00 PM Olga Gutierrez PT [facility] AT [facility]

## 2017-12-20 ENCOUNTER — HOSPITAL ENCOUNTER (OUTPATIENT)
Dept: PHYSICAL THERAPY | Age: 62
Discharge: HOME | End: 2017-12-20
Payer: COMMERCIAL

## 2017-12-20 PROCEDURE — 97140 MANUAL THERAPY 1/> REGIONS: CPT

## 2017-12-20 PROCEDURE — 97110 THERAPEUTIC EXERCISES: CPT

## 2017-12-20 NOTE — PROGRESS NOTES
PHYSICAL THERAPY - DAILY TREATMENT NOTE    Patient Name: Yancy Cam        Date: 2017  : 1955   YES Patient  Verified  Visit #:  15  of   15  Insurance: Payor: Jacqueline Uribe / Plan: [address] / Product Type: Commerical /      In time: 90 Out time: 1240   Total Treatment Time: 65     TREATMENT AREA =  Low back pain [M54.5]  Left hip pain [M25.552]    SUBJECTIVE  Pain Level (on 0 to 10 scale):  2  / 10   Medication Changes/New allergies or changes in medical history, any new surgeries or procedures? NO    If yes, update Summary List   Subjective Functional Status/Changes:  []  No changes reported     Functional improvements:   Pt with more symptoms today but overall still able to sit for 45 min - 1hr.   Functional impairments: Limited by R anterior hip pain, had an x       OBJECTIVE  Modalities Rationale:     decrease edema and decrease inflammation to improve patient's ability to perform ADLs   min [] Estim, type/location:                                      []  att     []  unatt     []  w/US     []  w/ice    []  w/heat    min []  Mechanical Traction: type/lbs                   []  pro   []  sup   []  int   []  cont    []  before manual    []  after manual    min []  Ultrasound, settings/location:      min []  Iontophoresis w/ dexamethasone, location:                                               []  take home patch       []  in clinic   10 min [x]  Ice     []  Heat    location/position: Supine to R hip and posterior L hip    min []  Vasopneumatic Device, press/temp:     min []  Other:    [x] Skin assessment post-treatment (if applicable):    []  intact    []  redness- no adverse reaction     []redness  adverse reaction:        15 min Manual Therapy: Technique:      [x] S/DTM []IASTM []PROM [x] Passive Stretching   [x]manual TPR    [x]Jt manipulation:Gr I [x] II [x]  III [] IV[] V[]  Treatment Area:  DTM to L posterior hip, R sartorius, hip flexor prone and supine   Rationale: decrease pain, increase ROM and increase tissue extensibility to improve patient's ability to stand, sit and walk for longer periods    40 min Therapeutic Exercise:  [x]  See flow sheet   Rationale:      increase ROM, increase strength and improve balance to improve the patients ability to perform ADLs, IADLS. T/o tx min Patient Education:  YES  Reviewed HEP   []  Progressed/Changed HEP based on: Other Objective/Functional Measures:    Pt program modified to advance to single leg bridges, bridges without UE with feet on ball. Pt able to advance stretching to LTR with knee flexion. Post Treatment Pain Level (on 0 to 10) scale:  2-3  / 10     ASSESSMENT  Assessment/Changes in Function:   Pt able to perform stretching for hip flexor without LBP. Negative Reilly test for iliopsoas. []  See Progress Note/Recertification   Patient will continue to benefit from skilled PT services to modify and progress therapeutic interventions, address functional mobility deficits, address ROM deficits and address strength deficits to attain remaining goals.    Progress toward goals / Updated goals:   1. Patient to report >= 75% improvement in symptoms with seating and standing duration to 30 minutes for IADLs--met  2. Patient will be independent with finalized HEP/ self maintenance to improve long-term outcomes--met.          PLAN  [x]  Upgrade activities as tolerated YES Continue plan of care   []  Discharge due to :    [x]  Other: Pt scheduled for injection on 12/27/17       Therapist: Bobby King PT    Date: 12/20/2017 Time: 12:10 PM     Future Appointments  Date Time Provider Noel Lozoya   12/20/2017 11:30 AM [facility]   12/28/2017 12:00 PM Bobby King PT [facility]

## 2017-12-28 ENCOUNTER — HOSPITAL ENCOUNTER (OUTPATIENT)
Dept: PHYSICAL THERAPY | Age: 62
Discharge: HOME | End: 2017-12-28
Payer: COMMERCIAL

## 2017-12-28 PROCEDURE — 97140 MANUAL THERAPY 1/> REGIONS: CPT

## 2017-12-28 PROCEDURE — 97110 THERAPEUTIC EXERCISES: CPT

## 2017-12-28 NOTE — PROGRESS NOTES
[address] PHYSICAL THERAPY AT [facility] [address] [place], [address]  Phone: [phone]  Fax: [phone] SUMMARY  Patient Name: Demetra Wise : 1955   Treatment/Medical Diagnosis: Low back pain [M54.5]  Left hip pain [M25.552]   Referral Source: Jorge Luis Thomson MD     Date of Initial Visit: 10/12/17 Attended Visits: 14 Missed Visits: 0     SUMMARY OF TREATMENT  Demetra Wise has been seen at our clinic 2-3x/wk for a total of 14 visits. Pt treatment has consisted of herapeutic exercise for lumbar ROM, hip/core strengthening, and manual therapy. CURRENT STATUS  Pt has had a good response to physical therapy treatment since initial evaluation. She reports improved ability to sit longer up to 1-2 hours from 5-10 minutes at [facility].  Pt also reports standing and walking longer and with less pain in L LE and lumbar region. Pt has returned to modified yoga classes, walking for exercise and aquatic classes without increased pain. Pt  continues to complain of difficulty and increased pain on R anterior hip with prolonged activity, stretching. Progress noted with tx, with PMH of R hip pain for 10 years. She is now able to perform SKTC pain-free B. Previous Goals:  1. Patient to report >= 75% improvement in symptoms with seating and standing duration to 30 minutes for IADLs. 2. Patient will be independent with finalized HEP/ self maintenance to improve long-term outcomes. 3. Increase FOTO score >=  57 to indicate improved function with use of B LE.  4 Restore full AROM for improved ADL participation. Prior Level/Current Level:  1) Prior Level: pt unable to sit for 5 min   Current Level: Pt able to sit and stand for >1 hr, 75% improvement   Goal Met? yes  2) Prior Level:n/a   Current Level: Pt I and compliant. Goal Met? yes  3) Prior Level: FOTO limited   Current Level: 70   Goal Met?  Yes  4) Prior Level:  Unable to perform full hip flexion and lumbar rotation   Current Level: yes     Goal Met? RECOMMENDATIONS  Pt with improved overall symptoms in L gluteal region, however, limited now by R hip pain. Please advise re: tx for R hip flexor, quadriceps dysfunction. Pt would benefit from soft tissue mobility and joint mobility for R hip improvement. If you have any questions/comments please contact us directly at [phone]. Thank you for allowing us to assist in the care of your patient.     Therapist Signature: Karla Antonio PT Date: 12/28/2017   Reporting Period:   10/12/17-12/28/17 Time: 11:09 AM

## 2017-12-28 NOTE — PROGRESS NOTES
PHYSICAL THERAPY - DAILY TREATMENT NOTE      Patient Name: Raad Edward        Date: 2017  : 1955   YES Patient  Verified  Visit #:   15     Insurance: Payor: Frances Oliva / Plan: [address] PT / Product Type: Commerical /      In time: 5 Out time: 1250   Total Treatment Time: 45     Medicare Time Tracking (below)   Total Timed Codes (min):   1:1 Treatment Time:       TREATMENT AREA =  Low back pain [M54.5]  Left hip pain [M25.552]    SUBJECTIVE    Pain Level (on 0 to 10 scale):  4  / 10   Medication Changes/New allergies or changes in medical history, any new surgeries or procedures? NO    If yes, update Summary List   Subjective Functional Status/Changes:  []  No changes reported       Functional improvements: Pt with less pain overall. Had an injection of steroid yesterday, so it is tender from that. Functional impairments: sore today and TTP at injection site. OBJECTIVE    30 min Therapeutic Exercise:  [x]  See flow sheet   Rationale:      increase ROM and increase strength to improve the patients ability to perform ADLs, IADLs with less pain     15 min Manual Therapy: Technique:      [x] S/DTM []IASTM []PROM [] Passive Stretching   [x]manual TPR    [x]Jt manipulation:Gr I [x] II [x]  III [] IV[] V[]  Treatment Area:  R LAD, R STM to hip flexor, L STM for piriformis, manual stretching   Rationale:      decrease pain, increase ROM and increase tissue extensibility to improve patient's ability to perform ADLs, IADLs with less pain         min Gait Training:    Rationale:        throughout therapy min Patient Education:  YES  Reviewed HEP   []  Progressed/Changed HEP based on: Other Objective/Functional Measures:    Pt with SKTC with min discomfort, pain. Post Treatment Pain Level (on 0 to 10) scale:   2  / 10     ASSESSMENT    Assessment/Changes in Function:     Pt with improved SKTC to full and painless.   Pt with ability to perform full lumbar rotation without pain. [x]  See Progress Note/Recertification/D/C   Patient will continue to benefit from skilled PT services to modify and progress therapeutic interventions, address functional mobility deficits, address ROM deficits, address strength deficits and analyze and address soft tissue restrictions to attain remaining goals. Progress toward goals / Updated goals:    D/C from HEP. PLAN    [x]  Upgrade activities as tolerated YES Continue plan of care   []  Discharge due to :    []  Other:      Therapist: Tobias Sharma PT    Date: 12/28/2017 Time: 12:09 PM   No future appointments.

## 2021-09-28 NOTE — PROGRESS NOTES
PHYSICAL THERAPY - DAILY TREATMENT NOTE    Patient Name: Santosh Lombardi        Date: 8/10/2017  : 1955 2   Patient  Verified: YES  Visit #:    Insurance: Payor: Calixto Shultz / Plan:  [address] / Product Type: Commerical /      In time: 8:30 Out time: 9   Total Treatment Time: 30     Medicare Time Tracking (below)   Total Timed Codes (min):    1:1 Treatment Time:        TREATMENT AREA/ DIAGNOSIS = Low back pain [M54.5]    SUBJECTIVE  Pain Level (on 0 to 10 scale):   0  / 10   Medication Changes/New allergies or changes in medical history, any new surgeries or procedures? NO    If yes, update Summary List   Subjective Functional Status/Changes:  []  No changes reported     Functional improvement 20% improved   Functional limitation it hurts when I sit         10 min Manual Therapy: DTM to L/S, OP with REIL and L piriformis release   Rationale:      decrease pain, increase ROM and increase tissue extensibility to improve patient's ability to perform ADLs without pain    20 min Therapeutic Exercise:  [x]  See flow sheet   Rationale:      increase ROM and increase strength to improve the patients ability to perform ADLs without pain          min Patient Education:  Yes    [x] Reviewed HEP   []  Progressed/Changed HEP based on:         Other Objective/Functional Measures:    Pt with L buttock pain with sitting  educated pt on correct sitting posture and body mechanics   Post Treatment Pain Level (on 0 to 10) scale:   0  / 10     ASSESSMENT  Assessment/Changes in Function:     seePN     [x]  See Progress Note/Recertification   Patient will continue to benefit from skilled PT services to modify and progress therapeutic interventions, address functional mobility deficits, address ROM deficits, address strength deficits, analyze and address soft tissue restrictions, analyze and cue movement patterns, analyze and modify body mechanics/ergonomics and assess and modify postural abnormalities to attain remaining goals.    Progress toward goals / Updated goals:    See PN     PLAN  [x]  Upgrade activities as tolerated YES Continue plan of care   []  Discharge due to :    []  Other:      Therapist: Tracy Escobar PT    Date: 8/10/2017 Time: 9:20 AM        Future Appointments  Date Time Provider Noel Lozoya   8/17/2017 8:30 AM Tracy Escobar PT [facility]

## 2023-08-15 ENCOUNTER — HOSPITAL ENCOUNTER (OUTPATIENT)
Facility: HOSPITAL | Age: 68
Setting detail: RECURRING SERIES
Discharge: HOME | End: 2023-08-18
Payer: MEDICARE

## 2023-08-15 PROCEDURE — 97162 PT EVAL MOD COMPLEX 30 MIN: CPT

## 2023-08-15 NOTE — PROGRESS NOTES
PHYSICAL THERAPY - DAILY TREATMENT NOTE    Patient Name: Vivien Avila    Date: 8/15/2023    : 1955  Insurance: Payor: Steven Dorman / Plan: Nadira Joaquin / Product Type: *No Product type* /      Patient  verified YES   Visit #   Current / Total 1 8-16   Time   In / Out 1:40 2:20   Pain   In / Out 5-7 5-7   Subjective Functional Status/Changes: SEE EVALUATION     TREATMENT AREA =  M25.512  LEFT SHOULDER PAIN     OBJECTIVE        Therapeutic Procedures: Tx Min Billable or 1:1 Min (if diff from Tx Min) Procedure, Rationale, Specifics   NB  03386 Self Care/Home Management (timed):  improve patient knowledge and understanding of pain reducing techniques, positioning, activity modification, diagnosis/prognosis, and physical therapy expectations, procedures and progression  to improve patient's ability to progress to PLOF and address remaining functional goals.   (see flow sheet as applicable)     Details if applicable:       NB       Details if applicable:            Details if applicable:            Details if applicable:            Details if applicable:     NB [facility] Totals Reminder: bill using total billable min of TIMED therapeutic procedures (example: do not include dry needle or estim unattended, both untimed codes, in totals to left)  8-22 min = 1 unit; 23-37 min = 2 units; 38-52 min = 3 units; 53-67 min = 4 units; 68-82 min = 5 units   Total Total     [x]  Patient Education billed concurrently with other procedures   [x] Review HEP    [] Progressed/Changed HEP, detail:    [] Other detail:       Objective Information/Functional Measures/Assessment    SEE EVALUATION    Patient will benefit from skilled PT services to address functional deficits and attain remaining goals as set in EVALUATION    Progress toward goals / Updated goals:  []  See Progress Note/Recertification    SEE EVAL    PLAN  YES Continue plan of care  [x]  Upgrade activities as tolerated  []  Discharge due to :  []

## 2023-08-15 NOTE — THERAPY EVALUATION
score to 65/100 indicating improved function in daily tasks  Status at last Eval: 45/100  Current Status: -  Goal Met?  -  2. Pt to indicate a Global Rating Of Change (GROC) of 4+ indicating \"moderately better\"  Status at last Eval: n/a  Current Status: -  Goal Met?  -  3. Pt to improve MMT of the left shoulder into flex, ER, ABD to 4+/5 without pain to indicate resolution of current status  Status at last Eval: Flex 4-/5 P!, ABD, 4/5 P!, ER 4/5 P! Current Status: -  Goal Met? -  4. Pt to report she can play PicleBall without the need for OTCs or ice  Status at last Eval: Using Aleve and ice daily  Current Status: -  Goal Met?  -    Frequency / Duration: Patient to be seen 1-2 times per week for 8 weeks    Patient/ Caregiver education and instruction: Diagnosis, prognosis,  self care, activity modification, and exercises  [x]  Plan of care has been reviewed with PTA, prn.    Certification Period: 8/15/23 - 11/13/23    Maryjane Rayo, PT DPT, OCS      8/15/2023       10:14 AM    Payor: Maria Elena Salmeron / Plan: [place] / Product Type: *No Product type* /     Physician signature required for Medicare, Medicaid, Worker's Comp, Direct Access   ===================================================================  I certify that the above Therapy Services are being furnished while the patient is under my care. I agree with the treatment plan and certify that this therapy is necessary. Physician's Signature:_________________________   DATE:_________   TIME:________                           RESHMA Carlson    ** Signature, Date and Time must be completed for valid certification **  [facility].  Thank you

## 2023-08-31 ENCOUNTER — HOSPITAL ENCOUNTER (OUTPATIENT)
Facility: HOSPITAL | Age: 68
Setting detail: RECURRING SERIES
End: 2023-08-31
Payer: MEDICARE

## 2023-08-31 PROCEDURE — 97110 THERAPEUTIC EXERCISES: CPT

## 2023-08-31 PROCEDURE — 97530 THERAPEUTIC ACTIVITIES: CPT

## 2023-08-31 PROCEDURE — 97140 MANUAL THERAPY 1/> REGIONS: CPT

## 2023-08-31 NOTE — PROGRESS NOTES
PHYSICAL THERAPY - DAILY TREATMENT NOTE    Patient Name: Glory Verduzco    Date: 2023    : 1955  Insurance: Payor: Kendrick Harrington / Plan: Tonia Correa / Product Type: *No Product type* /      Patient  verified YES   Visit #   Current / Total 2 8   Time   In / Out 1:40 2:35   Pain   In / Out 5 4   Subjective Functional Status/Changes: Says she played pickle ball but not back to back. She still has ache at night. Says the low back and hips are sore and stiff (pt gestures to her waist). Reports it's been going on for a long time and she does stretches. TREATMENT AREA =  Pain in left shoulder [M25.512], Low Back Pain M54.59    OBJECTIVE        Therapeutic Procedures: Tx Min Billable or 1:1 Min (if diff from Tx Min) Procedure, Rationale, Specifics   20  91738 Therapeutic Exercise (timed):  increase ROM, strength, coordination, balance, and proprioception to improve patient's ability to progress to PLOF and address remaining functional goals. (see flow sheet as applicable)     Details if applicable:       23  09631 Manual Therapy (timed):  decrease pain, increase ROM, increase tissue extensibility, decrease trigger points, and increase postural awareness to improve patient's ability to progress to PLOF and address remaining functional goals. The manual therapy interventions were performed at a separate and distinct time from the therapeutic activities interventions . (see flow sheet as applicable)     Details if applicable:  Supine: DTM holds to left QL, left piriformis med/lat and left obturator   12  23363 Therapeutic Activity (timed):  use of dynamic activities replicating functional movements to increase ROM, strength, coordination, balance, and proprioception in order to improve patient's ability to progress to PLOF and address remaining functional goals.   (see flow sheet as applicable)     Details if applicable:            Details if applicable:     2  Lumbar

## 2023-09-07 ENCOUNTER — HOSPITAL ENCOUNTER (OUTPATIENT)
Facility: HOSPITAL | Age: 68
Setting detail: RECURRING SERIES
Discharge: HOME | End: 2023-09-10
Payer: MEDICARE

## 2023-09-07 PROCEDURE — 97110 THERAPEUTIC EXERCISES: CPT

## 2023-09-07 PROCEDURE — 97140 MANUAL THERAPY 1/> REGIONS: CPT

## 2023-09-07 PROCEDURE — 97530 THERAPEUTIC ACTIVITIES: CPT

## 2023-09-07 NOTE — PROGRESS NOTES
HEP    Patient will continue to benefit from skilled PT services to modify and progress therapeutic interventions, analyze and address functional mobility deficits, analyze and address ROM deficits, analyze and address strength deficits, analyze and address soft tissue restrictions, analyze and cue for proper movement patterns, and analyze and modify for postural abnormalities to address functional deficits and attain remaining goals. Progress toward goals / Updated goals:  []  See Progress Note/Recertification    Short Term Goals: To be accomplished in 4 weeks  Pt to report adherence and demonstrate compliance with basic HEP to allow progress between visits  Status at last Eval: Initiated  Current Status: Expanded (8/31/23)  Goal Met?  -  2. Pt to report pain <4/10 at worst to allow improved function and QOL  Status at last Eval: 7/10  Current Status: -  Goal Met?  -  3. Pt to report non-disturbed sleep of 7hrs to allow improved healing and QOL   Status at last Eval: trouble getting to sleep and staying asleep nightly  Current Status: -  Goal Met?  -  4. Pt to demonstrate Left shoulder Flexion AROM without increase in pain towards improved reach and ADL. Status at last Eval: Painful fwd flexion  Current Status: -  Goal Met?  -        Long Term Goals: To be accomplished in 8 weeks  Pt to improve FOTO score to 65/100 indicating improved function in daily tasks  Status at last Eval: 45/100  Current Status: -  Goal Met?  -  2. Pt to indicate a Global Rating Of Change (GROC) of 4+ indicating \"moderately better\"  Status at last Eval: n/a  Current Status: -  Goal Met?  -  3. Pt to improve MMT of the left shoulder into flex, ER, ABD to 4+/5 without pain to indicate resolution of current status  Status at last Eval: Flex 4-/5 P!, ABD, 4/5 P!, ER 4/5 P! Current Status: -  Goal Met? -  4.  Pt to report she can play PicleBall without the need for OTCs or ice  Status at last Eval: Using Aleve and ice daily  Current

## 2023-09-13 ENCOUNTER — HOSPITAL ENCOUNTER (OUTPATIENT)
Facility: HOSPITAL | Age: 68
Setting detail: RECURRING SERIES
Discharge: HOME | End: 2023-09-16
Payer: MEDICARE

## 2023-09-13 PROCEDURE — 97140 MANUAL THERAPY 1/> REGIONS: CPT

## 2023-09-13 PROCEDURE — 97110 THERAPEUTIC EXERCISES: CPT

## 2023-09-13 PROCEDURE — 97530 THERAPEUTIC ACTIVITIES: CPT

## 2023-09-13 NOTE — PROGRESS NOTES
PHYSICAL THERAPY - DAILY TREATMENT NOTE    Patient Name: Mikala Esparza    Date: 2023    : 1955  Insurance: Payor: Tien Hathaway / Plan: Vicente Ojeda / Product Type: *No Product type* /      Patient  verified YES   Visit #   Current / Total 4 8   Time   In / Out 1:50 2:30   Pain   In / Out 4-5 back 2 back   Subjective Functional Status/Changes: Pt reports she is 40-50% of where she wants to be. Pt reports improvements in her pickleball play tolerance, she feels her AROM reaching back is better, like a service motion. Driving position is better. Reports compliance to her HEP. Feels better wearing compression socks as well. Pt reports continued difficulties in back soreness all the time, with to little movement, or too much. TREATMENT AREA =  Pain in left shoulder [M25.512], Low Back Pain M54.59    OBJECTIVE        Therapeutic Procedures: Tx Min Billable or 1:1 Min (if diff from Tx Min) Procedure, Rationale, Specifics   10  13969 Therapeutic Exercise (timed):  increase ROM, strength, coordination, balance, and proprioception to improve patient's ability to progress to PLOF and address remaining functional goals. (see flow sheet as applicable)     Details if applicable:       15  81139 Manual Therapy (timed):  decrease pain, increase ROM, increase tissue extensibility, decrease trigger points, and increase postural awareness to improve patient's ability to progress to PLOF and address remaining functional goals. The manual therapy interventions were performed at a separate and distinct time from the therapeutic activities interventions .  (see flow sheet as applicable)     Details if applicable:  Prone: STM to lumbar erectors, Pas to lumbar and sacrum, oscillations   15  04876 Therapeutic Activity (timed):  use of dynamic activities replicating functional movements to increase ROM, strength, coordination, balance, and proprioception in order to improve patient's ability to progress

## 2023-09-13 NOTE — THERAPY RECERTIFICATION
[facility]  [address], [facility] [facility] - Ph: [phone]  Fx: [phone]  PHYSICAL THERAPY PROGRESS NOTE  Patient Name: Yesi Slaughter : 1955   Treatment/Medical Diagnosis: Pain in left shoulder [M25.512] / M25.512  LEFT SHOULDER PAIN    Referral Source: RESHMA Stanton     Date of Initial Visit: 8/15/23 Attended Visits: 4 Missed Visits: 0     SUMMARY OF TREATMENT  Pt seen in clinic for to address Pain in left shoulder [M25.512]. Pt has been assessed, completed therapeutic exercises, neuromuscular re-ed, therapeutic functional activity, received manual therapy intervention, self-care strategies, HEP techniques and pt ed on condition consistency and follow-through. -    Subjective: Pt reports she is 40-50% of where she wants to be. Pt reports improvements in her pickleball play tolerance, she feels her AROM reaching back is better, like a service motion. Driving position is better. Reports compliance to her HEP. Feels better wearing compression socks as well. Pt reports continued difficulties in back soreness all the time, with to little movement, or too much. CURRENT STATUS  Pt seen for 4 weeks to address shoulder pain and low back pain. Pt with significant gains in shoulder function and pain reduction. Pt with full ROM, some difficulty in FIR and intermittent loading of the shoulder but her long head biceps tendonitis is largely improved. Low back complaints remain with lumbar hypomobility and sacral area pain and muscle guarding. This does improve with activity, stretches and manual therapy. Based on pt progress and current status I recommend continue with POC set at Evaluation for for more weeks. Short Term Goals:  To be accomplished in 4 weeks  Pt to report adherence and demonstrate compliance with basic HEP to allow progress between visits  Status at last Eval: Initiated  Current Status: Expanded

## 2023-09-14 ENCOUNTER — APPOINTMENT (OUTPATIENT)
Facility: HOSPITAL | Age: 68
End: 2023-09-14
Payer: MEDICARE

## 2023-09-21 ENCOUNTER — HOSPITAL ENCOUNTER (OUTPATIENT)
Facility: HOSPITAL | Age: 68
Setting detail: RECURRING SERIES
Discharge: HOME | End: 2023-09-24
Payer: MEDICARE

## 2023-09-21 PROCEDURE — 97530 THERAPEUTIC ACTIVITIES: CPT

## 2023-09-21 PROCEDURE — 97110 THERAPEUTIC EXERCISES: CPT

## 2023-09-21 NOTE — PROGRESS NOTES
PHYSICAL THERAPY - DAILY TREATMENT NOTE    Patient Name: Johnson Sender    Date: 2023    : 1955  Insurance: Payor: Vishal Manuel / Plan: Roc Maria / Product Type: *No Product type* /      Patient  verified YES   Visit #   Current / Total 5 8   Time   In / Out 1:00 1:40   Pain   In / Out 0-5 back 0-22 back   Subjective Functional Status/Changes: Says played pickleball for 2 hrs and then she was very sore, her back hurt and she had to sit back down. She then got ready for company, then went for a walk. Just just straight sore, inflamed, intense. Says now taking less Aleve. TREATMENT AREA =  Pain in left shoulder [M25.512], Low Back Pain M54.59    OBJECTIVE        Therapeutic Procedures: Tx Min Billable or 1:1 Min (if diff from Tx Min) Procedure, Rationale, Specifics   10  59548 Therapeutic Exercise (timed):  increase ROM, strength, coordination, balance, and proprioception to improve patient's ability to progress to PLOF and address remaining functional goals. (see flow sheet as applicable)     Details if applicable:       -  20830 Manual Therapy (timed):  decrease pain, increase ROM, increase tissue extensibility, decrease trigger points, and increase postural awareness to improve patient's ability to progress to PLOF and address remaining functional goals. The manual therapy interventions were performed at a separate and distinct time from the therapeutic activities interventions . (see flow sheet as applicable)     Details if applicable:  Prone: STM to lumbar erectors, Pas to lumbar and sacrum, oscillations   30  05361 Therapeutic Activity (timed):  use of dynamic activities replicating functional movements to increase ROM, strength, coordination, balance, and proprioception in order to improve patient's ability to progress to PLOF and address remaining functional goals.   (see flow sheet as applicable)     Details if applicable:            Details if applicable:

## 2023-09-27 ENCOUNTER — HOSPITAL ENCOUNTER (OUTPATIENT)
Facility: HOSPITAL | Age: 68
Setting detail: RECURRING SERIES
Discharge: HOME | End: 2023-09-30
Payer: MEDICARE

## 2023-09-27 PROCEDURE — 97110 THERAPEUTIC EXERCISES: CPT

## 2023-09-27 PROCEDURE — 97530 THERAPEUTIC ACTIVITIES: CPT

## 2023-09-27 PROCEDURE — 97140 MANUAL THERAPY 1/> REGIONS: CPT

## 2023-09-27 NOTE — PROGRESS NOTES
6:56 AM    Future Appointments   Date Time Provider [address]  [address]   9/27/2023  1:00 PM Violet Mckenna, PT [address] [facility]

## 2023-09-28 ENCOUNTER — APPOINTMENT (OUTPATIENT)
Facility: HOSPITAL | Age: 68
End: 2023-09-28
Payer: MEDICARE

## 2023-10-04 ENCOUNTER — HOSPITAL ENCOUNTER (OUTPATIENT)
Facility: HOSPITAL | Age: 68
Setting detail: RECURRING SERIES
Discharge: HOME | End: 2023-10-07
Payer: MEDICARE

## 2023-10-04 PROCEDURE — 97032 APPL MODALITY 1+ESTIM EA 15: CPT

## 2023-10-04 PROCEDURE — 20560 NDL INSJ W/O NJX 1 OR 2 MUSC: CPT

## 2023-10-04 PROCEDURE — 97535 SELF CARE MNGMENT TRAINING: CPT

## 2023-10-04 PROCEDURE — 97140 MANUAL THERAPY 1/> REGIONS: CPT

## 2023-10-11 ENCOUNTER — HOSPITAL ENCOUNTER (OUTPATIENT)
Facility: HOSPITAL | Age: 68
Setting detail: RECURRING SERIES
Discharge: HOME | End: 2023-10-14
Payer: MEDICARE

## 2023-10-11 PROCEDURE — 97535 SELF CARE MNGMENT TRAINING: CPT

## 2023-10-11 PROCEDURE — 97140 MANUAL THERAPY 1/> REGIONS: CPT

## 2023-10-11 NOTE — PROGRESS NOTES
PHYSICAL THERAPY - DAILY TREATMENT NOTE    Patient Name: Brad Reyes    Date: 10/11/2023    : 1955  Insurance: Payor: Maria Elena Salmeron / Plan: Deandra Bendena / Product Type: *No Product type* /      Patient  verified YES   Visit #   Current / Total 8 8   Time   In / Out 9:40 10:20   Pain   In / Out \"Sore\" glute 0-2 back   Subjective Functional Status/Changes: Pt reports she is 50% of where she wants to be. Reports improvements in left shoulder pain and function, no issue now, she can play pickleball, able to participate in exercise class. Reports continued difficulties in the whole lower back \"it's killing me. \" She stretches a lot and no relief. Says sitting and standing, walking is hard. The front hips hurt after one mile. Says laying down it ok. Her PCP said to go to pain management  . She feels nothing is really helping the low back. Say she didn't notice too much after the needling last time. The best was the massage intervention. TREATMENT AREA =  Pain in left shoulder [M25.512], Low Back Pain M54.59    OBJECTIVE        Therapeutic Procedures: Tx Min Billable or 1:1 Min (if diff from Tx Min) Procedure, Rationale, Specifics   -  45581 Therapeutic Exercise (timed):  increase ROM, strength, coordination, balance, and proprioception to improve patient's ability to progress to PLOF and address remaining functional goals. (see flow sheet as applicable)     Details if applicable:       23  27345 Manual Therapy (timed):  decrease pain, increase ROM, increase tissue extensibility, decrease trigger points, and increase postural awareness to improve patient's ability to progress to PLOF and address remaining functional goals. The manual therapy interventions were performed at a separate and distinct time from the therapeutic activities interventions . (see flow sheet as applicable)     Details if applicable:  Prone: palpation, needle manipulation/spinning.      66872 Therapeutic Activity

## 2023-10-11 NOTE — THERAPY RECERTIFICATION
[facility]  [address], [address] [address],[place] - Ph: [phone]  Fx: [phone]  PHYSICAL THERAPY PROGRESS NOTE  Patient Name: Mikala Esparza : 1955   Treatment/Medical Diagnosis: Pain in left shoulder [M25.512] / M25.512  LEFT SHOULDER PAIN and M54.59  OTHER LOWER BACK PAIN    Referral Source: RESHMA Guzman     Date of Initial Visit: 8/15/23 Attended Visits: 8 Missed Visits: 0     SUMMARY OF TREATMENT  Pt seen in clinic for to address Pain in left shoulder [M25.512]. Pt has been assessed, completed therapeutic exercises, neuromuscular re-ed, therapeutic functional activity, received manual therapy intervention, self-care strategies, HEP techniques and pt ed on condition consistency and follow-through. Therapeutic dry needling    Subjective: Pt reports she is 50% of where she wants to be. Reports improvements in left shoulder pain and function, no issue now, she can play pickleball, able to participate in exercise class. Reports continued difficulties in the whole lower back \"it's killing me. \" She stretches a lot and no relief. Says sitting and standing, walking is hard. The front hips hurt after one mile. Says laying down it ok. Her PCP said to go to pain management  . She feels nothing is really helping the low back. Say she didn't notice too much after the needling last time. The best was the massage intervention. CURRENT STATUS  Pt seen for 8 visits to address left shoulder and low back pain. Shoulder is primarily resolved, with improved function and reduced pain, while patient is able to continue with pickleball daily. , LBP remains. Pt with similar lumbar presentation of underlying arthritic type symptoms with lower left sided muscle guarding. Pt wishing one more visit to attempt to reduce pain at this time. I am agreeable. Will attempt symptom reduction and ensure a proper HEP.     Lumbar Assessment:

## 2023-10-18 ENCOUNTER — HOSPITAL ENCOUNTER (OUTPATIENT)
Facility: HOSPITAL | Age: 68
Setting detail: RECURRING SERIES
Discharge: HOME | End: 2023-10-21
Payer: MEDICARE

## 2023-10-18 PROCEDURE — 97140 MANUAL THERAPY 1/> REGIONS: CPT

## 2023-10-18 NOTE — THERAPY DISCHARGE
[facility] THERAPY  [address], [address] [facility] - Ph: [phone]  Fx: [phone]  DISCHARGE  NOTE  Patient Name: Marquis Sheridan : 1955   Treatment/Medical Diagnosis: Pain in left shoulder [M25.512] / M25.512  LEFT SHOULDER PAIN and M54.59  OTHER LOWER BACK PAIN    Referral Source: [facility]     Date of Initial Visit: 8/15/23 Attended Visits: 9 Missed Visits: 0     SUMMARY OF TREATMENT  Pt seen in clinic for to address Pain in left shoulder [M25.512]. Pt has been assessed, completed therapeutic exercises, neuromuscular re-ed, therapeutic functional activity, received manual therapy intervention, self-care strategies, HEP techniques and pt ed on condition consistency and follow-through. Therapeutic dry needling    CURRENT STATUS  Pt discharging today with short term benefit from manual therapy/massage to the low back. I recommend pt seek massage therapy and continued stretching program. Pt with limited ability to adjust her work-rest ratio. Pt presentation is arthritic with subsequent muscle fatigue. Activity modification is appropriate for management as well. Both parties agreed to DC plan, all questions answered. Lumbar Assessment: 23  Flexion: Full finger-tips to floor, mostly hip flexion, no lumbar reversal.   Extension: 25 deg, compression pain at lumbosacral junction. Side Bend: WNL and typical contralateral stretch  Rotation: Limited to 40 deg bilat, non-painful    Palpation: TTP to left QL, TTP to left piriformis and deep hip rotators. Lumbar Assessment 10/11/23  Flexion: finger-tips to floor, mostly hip flexion  Ext: 25 deg, non-painful  Rotation: L: 40 deg, R: 45 deg Left sided LBP  Palpation: TTP to left QL, TTP to left piriformis and deep hip rotators. GOALS:  Short Term Goals:  To be accomplished in 4 weeks  Pt to report adherence and demonstrate compliance with basic HEP to allow progress
complains of pain/discomfort

## 2023-10-18 NOTE — PROGRESS NOTES
(10/11/23)  Goal Met? -MET  4. Pt to report she can play PicleBall without the need for OTCs or ice  Status at last Eval: Using Aleve and ice daily  Current Status: MET  Goal Met? YES (9/13/23)    NEW GOALS:  Pt to self report she does not need to lay down to rest her back after going for a walk, or after cooking to indicate improved QOL and ADL completion  Current Status: (10/11/23) Needs to lay down after walking or standing too much. (10/18/23) Must lay down after activty.      PLAN  YES Continue plan of care  [x]  Upgrade activities as tolerated  []  Discharge due to :  []  Other:    Altaf Davis, PT DPT, OCS   10/18/2023    7:15 AM    Future Appointments   Date Time Provider [address]   10/18/2023 11:00 AM Altaf Davis, PT [facility]   11/1/2023 11:00 AM Altaf Davis PT [facility]   11/8/2023  1:00 PM Altaf Davis, PT [id number] [facility]   11/15/2023  1:00 PM Saba Roman, PT [id number] [facility]